# Patient Record
Sex: FEMALE | Race: WHITE | Employment: UNEMPLOYED | URBAN - METROPOLITAN AREA
[De-identification: names, ages, dates, MRNs, and addresses within clinical notes are randomized per-mention and may not be internally consistent; named-entity substitution may affect disease eponyms.]

---
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
## 2025-01-03 LAB
Lab: ABNORMAL
Lab: POSITIVE
NTRA ALPHA-THALASSEMIA: NEGATIVE
NTRA BETA-HEMOGLOBINOPATHIES: NEGATIVE
NTRA CANAVAN DISEASE: NEGATIVE
NTRA CYSTIC FIBROSIS: NEGATIVE
NTRA DUCHENNE/BECKER MUSCULAR DYSTROPHY: NEGATIVE
NTRA FAMILIAL DYSAUTONOMIA: NEGATIVE
NTRA FRAGILE X SYNDROME: NEGATIVE
NTRA GALACTOSEMIA: NEGATIVE
NTRA GAUCHER DISEASE: NEGATIVE
NTRA MEDIUM CHAIN ACYL-COA DEHYDROGENASE DEFICIENCY: NEGATIVE
NTRA POLYCYSTIC KIDNEY DISEASE, AUTOSOMAL RECESSIVE: NEGATIVE
NTRA SMITH-LEMLI-OPITZ SYNDROME: NEGATIVE
NTRA SPINAL MUSCULAR ATROPHY: POSITIVE
NTRA TAY-SACHS DISEASE: NEGATIVE

## 2025-01-15 ENCOUNTER — ROUTINE PRENATAL (OUTPATIENT)
Dept: OBGYN CLINIC | Age: 20
End: 2025-01-15
Payer: MEDICAID

## 2025-01-15 VITALS
SYSTOLIC BLOOD PRESSURE: 116 MMHG | HEART RATE: 93 BPM | DIASTOLIC BLOOD PRESSURE: 78 MMHG | WEIGHT: 189 LBS | BODY MASS INDEX: 31.45 KG/M2

## 2025-01-15 DIAGNOSIS — Z3A.15 15 WEEKS GESTATION OF PREGNANCY: ICD-10-CM

## 2025-01-15 DIAGNOSIS — Z14.8 CARRIER OF SPINAL MUSCULAR ATROPHY: ICD-10-CM

## 2025-01-15 DIAGNOSIS — O09.92 HRP (HIGH RISK PREGNANCY), SECOND TRIMESTER: Primary | ICD-10-CM

## 2025-01-15 PROCEDURE — 99214 OFFICE O/P EST MOD 30 MIN: CPT | Performed by: CLINICAL NURSE SPECIALIST

## 2025-01-15 NOTE — PROGRESS NOTES
Che Og is a 19 y.o. female 15w1d, patient is positive for SMA carrier and she is requesting the father be tested.         OB History    Para Term  AB Living   3       2     SAB IAB Ectopic Molar Multiple Live Births   2                # Outcome Date GA Lbr Christoph/2nd Weight Sex Type Anes PTL Lv   3 Current            2 SAB            1 SAB                Vitals  BP: 116/78  Weight - Scale: 85.7 kg (189 lb)  Pulse: 93  Patient Position: Sitting  Albumin: Negative  Glucose: Negative  Fetal HR: 158  Movement: Present      The patient was seen and evaluated. There was Positive fetal movements. No contractions or leakage of fluid. Signs and symptoms of  labor as well as labor were reviewed.The Nuchal Translucency testing was declined and NIPT-Horizon was completed and shown to be low risk and SMA carrier.  A single marker MSAFP was ordered for a 15-20 week gestational age window. TOP ST [place] Reviewed. Dates were reviewed with the patient.  An 18-22 week anatomy ultrasound has been ordered.  The patient will return to the office for her next visit in 4 weeks. See antepartum flow sheet.     The following was discussed:  A. Counseling on the incidents of birth defects in the general population being 2-4% and that ultrasound does not diagnose every form of congenital abnormality and has limitations .   B. The only way to evaluate the chromosomal makeup to near 100% certainty is with invasive testing such as chorionic villous sampling or amniocentesis.   C. The options for testing listed in (B) with detail and all risks/benefits and alternatives will be discussed in the high risk setting.   D. The patient was counseled on the benefits of NIPT testing and UNITY-Complete panel after 9 weeks of gestation. NIPT testing (UNITY-Complete) options were discussed with the patient for recessive conditions and aneuploidies. This involves taking a maternal blood sample and                     using

## 2025-01-27 DIAGNOSIS — N92.6 MISSED MENSES: ICD-10-CM

## 2025-01-27 DIAGNOSIS — Z32.01 POSITIVE PREGNANCY TEST: ICD-10-CM

## 2025-01-27 DIAGNOSIS — O36.80X0 PREGNANCY WITH INCONCLUSIVE FETAL VIABILITY, SINGLE OR UNSPECIFIED FETUS: ICD-10-CM

## 2025-01-27 RX ORDER — PNV NO.95/FERROUS FUM/FOLIC AC 28MG-0.8MG
1 TABLET ORAL DAILY
Qty: 90 TABLET | Refills: 3 | OUTPATIENT
Start: 2025-01-27

## 2025-01-28 ENCOUNTER — ROUTINE PRENATAL (OUTPATIENT)
Dept: OBGYN CLINIC | Age: 20
End: 2025-01-28
Payer: MEDICAID

## 2025-01-28 ENCOUNTER — HOSPITAL ENCOUNTER (OUTPATIENT)
Age: 20
Setting detail: SPECIMEN
Discharge: HOME | End: 2025-01-28

## 2025-01-28 VITALS
HEART RATE: 88 BPM | SYSTOLIC BLOOD PRESSURE: 112 MMHG | DIASTOLIC BLOOD PRESSURE: 82 MMHG | WEIGHT: 190 LBS | BODY MASS INDEX: 31.62 KG/M2

## 2025-01-28 DIAGNOSIS — N76.0 BV (BACTERIAL VAGINOSIS): ICD-10-CM

## 2025-01-28 DIAGNOSIS — N89.8 VAGINAL ITCHING: ICD-10-CM

## 2025-01-28 DIAGNOSIS — Z14.8 CARRIER OF SPINAL MUSCULAR ATROPHY: ICD-10-CM

## 2025-01-28 DIAGNOSIS — Z3A.17 17 WEEKS GESTATION OF PREGNANCY: ICD-10-CM

## 2025-01-28 DIAGNOSIS — N89.8 VAGINAL DISCHARGE: ICD-10-CM

## 2025-01-28 DIAGNOSIS — O09.92 HRP (HIGH RISK PREGNANCY), SECOND TRIMESTER: Primary | ICD-10-CM

## 2025-01-28 DIAGNOSIS — B96.89 BV (BACTERIAL VAGINOSIS): ICD-10-CM

## 2025-01-28 PROCEDURE — 99213 OFFICE O/P EST LOW 20 MIN: CPT | Performed by: CLINICAL NURSE SPECIALIST

## 2025-01-28 RX ORDER — METRONIDAZOLE 500 MG/1
500 TABLET ORAL 2 TIMES DAILY
Qty: 14 TABLET | Refills: 0 | Status: SHIPPED | OUTPATIENT
Start: 2025-01-28 | End: 2025-02-04

## 2025-01-28 NOTE — PROGRESS NOTES
Che Og is a 19 y.o. female 17w0d, reports green to yellow discharge        OB History    Para Term  AB Living   3       2     SAB IAB Ectopic Molar Multiple Live Births   2                # Outcome Date GA Lbr Christoph/2nd Weight Sex Type Anes PTL Lv   3 Current            2 SAB            1 SAB              Vitals  BP: 112/82  Weight - Scale: 86.2 kg (190 lb)  Pulse: 88  Patient Position: Sitting  Albumin: Negative  Glucose: Negative  Fetal HR: 155  Movement: Present      The patient was seen and evaluated. There was Positive fetal movements. No contractions or leakage of fluid. Signs and symptoms of  labor as well as labor were reviewed.The Nuchal Translucency testing was declined and NIPT-Horizon was completed and she is low risk and carrier for spinal muscular atrophy. A single marker MSAFP was ordered for a 15-20 week gestational age window. TOP ST [place] Reviewed. Dates were reviewed with the patient.  An 18-22 week anatomy ultrasound has been ordered.  The patient will return to the office for her next visit in 4 weeks. See antepartum flow sheet.     The following was discussed:  A. Counseling on the incidents of birth defects in the general population being 2-4% and that ultrasound does not diagnose every form of congenital abnormality and has limitations .   B. The only way to evaluate the chromosomal makeup to near 100% certainty is with invasive testing such as chorionic villous sampling or amniocentesis.   C. The options for testing listed in (B) with detail and all risks/benefits and alternatives will be discussed in the high risk setting.   D. The patient was counseled on the benefits of NIPT testing and UNITY-Complete panel after 9 weeks of gestation. NIPT testing (UNITY-Complete) options were discussed with the patient for recessive conditions and aneuploidies. This involves taking a maternal blood sample and                     using cell-free DNA to determine

## 2025-01-29 LAB
C TRACH DNA SPEC QL PROBE+SIG AMP: NEGATIVE
CANDIDA SPECIES: NEGATIVE
GARDNERELLA VAGINALIS: NEGATIVE
N GONORRHOEA DNA SPEC QL PROBE+SIG AMP: NEGATIVE
SOURCE: NORMAL
SPECIMEN DESCRIPTION: NORMAL
TRICHOMONAS: NEGATIVE

## 2025-02-18 ENCOUNTER — ROUTINE PRENATAL (OUTPATIENT)
Dept: PERINATAL CARE | Age: 20
End: 2025-02-18
Payer: MEDICAID

## 2025-02-18 VITALS
DIASTOLIC BLOOD PRESSURE: 66 MMHG | BODY MASS INDEX: 32.49 KG/M2 | TEMPERATURE: 98.7 F | HEART RATE: 86 BPM | WEIGHT: 195 LBS | SYSTOLIC BLOOD PRESSURE: 122 MMHG | HEIGHT: 65 IN | RESPIRATION RATE: 16 BRPM

## 2025-02-18 DIAGNOSIS — Z34.90 THIRD PREGNANCY: Primary | ICD-10-CM

## 2025-02-18 DIAGNOSIS — Z3A.20 20 WEEKS GESTATION OF PREGNANCY: ICD-10-CM

## 2025-02-18 PROBLEM — O99.210 OBESITY AFFECTING PREGNANCY: Status: ACTIVE | Noted: 2025-02-18

## 2025-02-18 PROBLEM — O09.899 HIGH RISK TEEN PREGNANCY, ANTEPARTUM: Status: ACTIVE | Noted: 2025-02-18

## 2025-02-18 PROBLEM — O99.330 TOBACCO USE COMPLICATING PREGNANCY: Status: ACTIVE | Noted: 2025-02-18

## 2025-02-18 PROBLEM — Z14.8 GENETIC CARRIER: Status: ACTIVE | Noted: 2025-02-18

## 2025-02-18 PROBLEM — O43.119 ANTEPARTUM PLACENTA CIRCUMVALLATA: Status: ACTIVE | Noted: 2025-02-18

## 2025-02-18 PROCEDURE — 76811 OB US DETAILED SNGL FETUS: CPT | Performed by: OBSTETRICS & GYNECOLOGY

## 2025-02-18 PROCEDURE — 99999 PR OFFICE/OUTPT VISIT,PROCEDURE ONLY: CPT | Performed by: OBSTETRICS & GYNECOLOGY

## 2025-02-18 NOTE — PROGRESS NOTES
[facility] MATERNAL FETAL MED  [address]  [facility] [address]  Dept: [phone]     2025    RE:  Che Og     : 2005   AGE: 19 y.o.     Dear Dr. Ramírez,    Thank you for allowing me to see Che Og.  As I'm sure you will recall, Che Og is a 19 y.o. [id number]'s last menstrual period was 10/01/2024. Estimated Date of Delivery: 25 at 20w0d seen in our office today for the following:    REASON FOR VISIT: Level II    Patient Active Problem List    Diagnosis Date Noted    Third pregnancy 2025    20 weeks gestation of pregnancy 2025    Encounter for supervision of normal pregnancy in first trimester 2024        PAST HISTORY:  OB History    Para Term  AB Living   3       2     SAB IAB Ectopic Molar Multiple Live Births   2                # Outcome Date GA Lbr Christoph/2nd Weight Sex Type Anes PTL Lv   3 Current            2 SAB            1 SAB                   MEDICAL:  History reviewed. No pertinent past medical history.     SURGICAL:  History reviewed. No pertinent surgical history.    ALLERGIES:    No Known Allergies      MEDICATIONS:    Current Outpatient Medications   Medication Sig Dispense Refill    Prenatal Vit-Fe Fumarate-FA (PRENATAL VITAMIN) 27-0.8 MG TABS Take 1 tablet by mouth daily 90 tablet 3    doxyLAMINE succinate (GNP SLEEP AID) 25 MG tablet Take 1 tablet by mouth nightly as needed (nausea) 14 tablet 2    pyridoxine (RA VITAMIN B-6) 50 MG tablet Take 0.5 tablets by mouth nightly as needed (nausea) 30 tablet 3     No current facility-administered medications for this visit.        Social History     Socioeconomic History    Marital status: Single     Spouse name: None    Number of children: None    Years of education: None    Highest education level: None   Tobacco Use    Smoking status: Never     Passive exposure: Yes    Smokeless tobacco: Never

## 2025-02-19 ENCOUNTER — ROUTINE PRENATAL (OUTPATIENT)
Dept: OBGYN CLINIC | Age: 20
End: 2025-02-19
Payer: MEDICAID

## 2025-02-19 ENCOUNTER — HOSPITAL ENCOUNTER (OUTPATIENT)
Age: 20
Setting detail: SPECIMEN
Discharge: HOME | End: 2025-02-19

## 2025-02-19 VITALS
SYSTOLIC BLOOD PRESSURE: 120 MMHG | HEART RATE: 89 BPM | BODY MASS INDEX: 32.62 KG/M2 | WEIGHT: 196 LBS | DIASTOLIC BLOOD PRESSURE: 68 MMHG

## 2025-02-19 DIAGNOSIS — O09.92 HRP (HIGH RISK PREGNANCY), SECOND TRIMESTER: ICD-10-CM

## 2025-02-19 DIAGNOSIS — O99.332 PREGNANCY COMPLICATED BY TOBACCO USE IN SECOND TRIMESTER: ICD-10-CM

## 2025-02-19 DIAGNOSIS — Z3A.20 20 WEEKS GESTATION OF PREGNANCY: Primary | ICD-10-CM

## 2025-02-19 DIAGNOSIS — Z14.8 GENETIC CARRIER: ICD-10-CM

## 2025-02-19 DIAGNOSIS — O99.212 OBESITY AFFECTING PREGNANCY IN SECOND TRIMESTER, UNSPECIFIED OBESITY TYPE: ICD-10-CM

## 2025-02-19 DIAGNOSIS — Z3A.20 20 WEEKS GESTATION OF PREGNANCY: ICD-10-CM

## 2025-02-19 DIAGNOSIS — O43.119 ANTEPARTUM PLACENTA CIRCUMVALLATA: ICD-10-CM

## 2025-02-19 PROBLEM — Z34.91 ENCOUNTER FOR SUPERVISION OF NORMAL PREGNANCY IN FIRST TRIMESTER: Status: RESOLVED | Noted: 2024-12-17 | Resolved: 2025-02-19

## 2025-02-19 PROCEDURE — 99214 OFFICE O/P EST MOD 30 MIN: CPT | Performed by: STUDENT IN AN ORGANIZED HEALTH CARE EDUCATION/TRAINING PROGRAM

## 2025-02-19 PROCEDURE — 36415 COLL VENOUS BLD VENIPUNCTURE: CPT | Performed by: STUDENT IN AN ORGANIZED HEALTH CARE EDUCATION/TRAINING PROGRAM

## 2025-02-19 RX ORDER — PNV NO.95/FERROUS FUM/FOLIC AC 28MG-0.8MG
1 TABLET ORAL DAILY
Qty: 90 TABLET | Refills: 3 | Status: SHIPPED | OUTPATIENT
Start: 2025-02-19

## 2025-02-19 NOTE — PROGRESS NOTES
Prenatal Visit    Che Og is a 19 y.o. female  at 20w1d    The patient was seen and evaluated. She has no complaints today. She reports positive fetal movements. She denies contractions, vaginal bleeding and leakage of fluid. Signs and symptoms of labor and pre-eclampsia were reviewed with the patient in detail. She is to report any of these if they occur. She currently denies signs or symptoms of pre-eclampsia including headache, vision changes, RUQ pain.    The patient is considering the influenza vaccine this year.     The problem list reflects the active issues addressed during today's visit    Vitals:  BP: 120/68  Weight - Scale: 88.9 kg (196 lb)  Pulse: 89  Patient Position: Sitting  Albumin: Negative  Glucose: Negative  Fundal Height (cm): 20 cm  Fetal HR: 145  Movement: Present     PRENATAL LAB RESULTS:   Blood Type/Rh: A pos  Antibody Screen: negative  Hemoglobin, Hematocrit, Platelets: 12.9/39.8/260  Rubella: immune  T. Pallidum, IgG: non-reactive  Hepatitis B Surface Antigen: non-reactive   Hepatitis C Antibody: non-reactive   HIV: non-reactive   Gonorrhea: negative  Chlamydia: negative  Urine culture: negative, date: 24  Urine Drug Screen: negative  Group B Strep:  **    28wk 1 hour Glucose Tolerance Test: **    Carrier Screen: SMA carrier  MSAFP: **  Non-Invasive Prenatal Testing: low risk for aneuploidy  Anatomy US: posterior circumvallate placenta, 3VC normal insertion, female in breech presentation, normal fetal anatomy    Tdap:  Flu shot: discussed  Last Pap: n/a    Assessment & Plan:  Che Og is a 19 y.o. female  at 20w1d   - Vitals stable  - Prenatal labs completed and reviewed   - An 18-22 week anatomy ultrasound has been completed and reviewed   - Will clarify if patient requires further MFM appts, none currently scheduled   - NIPT low risk   - Pt desires AFP, will complete today   - Influenza vaccination: R/B/A discussed and patient will consider   -

## 2025-02-22 LAB
AFP INTERPRETATION: NORMAL
AFP MOM: 1.01
AFP SPECIMEN: NORMAL
AFP: 51 NG/ML
DATE OF BIRTH: NORMAL
DATING METHOD: NORMAL
DETERMINED BY: NORMAL
DIABETIC: NEGATIVE
DONOR EGG?: NORMAL
DUE DATE: NORMAL
ESTIMATED DUE DATE: NORMAL
FAMILY HISTORY NTD: NEGATIVE
GESTATIONAL AGE: NORMAL
IN VITRO FERTILIZATION: NORMAL
INSULIN REQ DIABETES: NO
LAST MENSTRUAL PERIOD: NORMAL
MATERNAL AGE AT EDD: 20.4 YR
MATERNAL WEIGHT: 196
MONOCHORIONIC TWINS: NORMAL
NUMBER OF FETUSES: NORMAL
PATIENT WEIGHT UNITS: NORMAL
PATIENT WEIGHT: NORMAL
RACE (MATERNAL): NORMAL
RACE: NORMAL
REPEAT SPECIMEN?: NORMAL
SMOKING: NORMAL
SMOKING: NORMAL
VALPROIC/CARBAMAZEP: NORMAL
ZZ NTE CLEAN UP: HISTORY: NO

## 2025-03-19 ENCOUNTER — ROUTINE PRENATAL (OUTPATIENT)
Dept: OBGYN CLINIC | Age: 20
End: 2025-03-19
Payer: MEDICAID

## 2025-03-19 VITALS
BODY MASS INDEX: 33.95 KG/M2 | SYSTOLIC BLOOD PRESSURE: 118 MMHG | DIASTOLIC BLOOD PRESSURE: 70 MMHG | HEART RATE: 99 BPM | WEIGHT: 204 LBS

## 2025-03-19 DIAGNOSIS — Z14.8 GENETIC CARRIER: ICD-10-CM

## 2025-03-19 DIAGNOSIS — O43.119 ANTEPARTUM PLACENTA CIRCUMVALLATA: ICD-10-CM

## 2025-03-19 DIAGNOSIS — O99.212 OBESITY AFFECTING PREGNANCY IN SECOND TRIMESTER, UNSPECIFIED OBESITY TYPE: ICD-10-CM

## 2025-03-19 DIAGNOSIS — O99.332 PREGNANCY COMPLICATED BY TOBACCO USE IN SECOND TRIMESTER: ICD-10-CM

## 2025-03-19 DIAGNOSIS — O09.92 HRP (HIGH RISK PREGNANCY), SECOND TRIMESTER: Primary | ICD-10-CM

## 2025-03-19 DIAGNOSIS — Z3A.24 24 WEEKS GESTATION OF PREGNANCY: ICD-10-CM

## 2025-03-19 PROCEDURE — 99213 OFFICE O/P EST LOW 20 MIN: CPT | Performed by: CLINICAL NURSE SPECIALIST

## 2025-03-19 NOTE — PROGRESS NOTES
counseled on her preventative health maintenance recommendations and follow-up.      Electronically signed by: Belen Ramírez CNP

## 2025-04-07 ENCOUNTER — TELEPHONE (OUTPATIENT)
Dept: OBGYN CLINIC | Age: 20
End: 2025-04-07

## 2025-04-16 ENCOUNTER — ROUTINE PRENATAL (OUTPATIENT)
Dept: OBGYN CLINIC | Age: 20
End: 2025-04-16
Payer: MEDICAID

## 2025-04-16 ENCOUNTER — HOSPITAL ENCOUNTER (OUTPATIENT)
Age: 20
Setting detail: SPECIMEN
Discharge: HOME | End: 2025-04-16

## 2025-04-16 VITALS
HEART RATE: 102 BPM | DIASTOLIC BLOOD PRESSURE: 70 MMHG | BODY MASS INDEX: 35.94 KG/M2 | SYSTOLIC BLOOD PRESSURE: 118 MMHG | WEIGHT: 216 LBS

## 2025-04-16 DIAGNOSIS — O99.212 OBESITY AFFECTING PREGNANCY IN SECOND TRIMESTER, UNSPECIFIED OBESITY TYPE: ICD-10-CM

## 2025-04-16 DIAGNOSIS — Z3A.28 28 WEEKS GESTATION OF PREGNANCY: Primary | ICD-10-CM

## 2025-04-16 DIAGNOSIS — Z3A.28 28 WEEKS GESTATION OF PREGNANCY: ICD-10-CM

## 2025-04-16 DIAGNOSIS — O09.92 HRP (HIGH RISK PREGNANCY), SECOND TRIMESTER: ICD-10-CM

## 2025-04-16 DIAGNOSIS — O99.333 PREGNANCY COMPLICATED BY TOBACCO USE IN THIRD TRIMESTER: ICD-10-CM

## 2025-04-16 DIAGNOSIS — O09.899 HIGH RISK TEEN PREGNANCY, ANTEPARTUM: ICD-10-CM

## 2025-04-16 DIAGNOSIS — Z14.8 GENETIC CARRIER: ICD-10-CM

## 2025-04-16 LAB
BASOPHILS # BLD: 0.06 K/UL (ref 0–0.2)
BASOPHILS NFR BLD: 0 % (ref 0–2)
EOSINOPHIL # BLD: 0.16 K/UL (ref 0–0.44)
EOSINOPHILS RELATIVE PERCENT: 1 % (ref 1–4)
ERYTHROCYTE [DISTWIDTH] IN BLOOD BY AUTOMATED COUNT: 13.3 % (ref 11.8–14.4)
GLUCOSE 3H P 100 G GLC PO SERPL-MCNC: 159 MG/DL
HCT VFR BLD AUTO: 34.9 % (ref 36.3–47.1)
HGB BLD-MCNC: 11.1 G/DL (ref 11.9–15.1)
IMM GRANULOCYTES # BLD AUTO: 0.29 K/UL (ref 0–0.3)
IMM GRANULOCYTES NFR BLD: 2 %
LYMPHOCYTES NFR BLD: 2.04 K/UL (ref 1.2–5.2)
LYMPHOCYTES RELATIVE PERCENT: 14 % (ref 25–45)
MCH RBC QN AUTO: 29.1 PG (ref 25.2–33.5)
MCHC RBC AUTO-ENTMCNC: 31.8 G/DL (ref 28.4–34.8)
MCV RBC AUTO: 91.6 FL (ref 82.6–102.9)
MONOCYTES NFR BLD: 0.63 K/UL (ref 0.1–1.4)
MONOCYTES NFR BLD: 4 % (ref 2–8)
NEUTROPHILS NFR BLD: 79 % (ref 34–64)
NEUTS SEG NFR BLD: 11.96 K/UL (ref 1.8–8)
NRBC BLD-RTO: 0 PER 100 WBC
PLATELET # BLD AUTO: 265 K/UL (ref 138–453)
PMV BLD AUTO: 9.6 FL (ref 8.1–13.5)
RBC # BLD AUTO: 3.81 M/UL (ref 3.95–5.11)
T PALLIDUM AB SER QL IA: NONREACTIVE
WBC OTHER # BLD: 15.1 K/UL (ref 4.5–13.5)

## 2025-04-16 PROCEDURE — 36415 COLL VENOUS BLD VENIPUNCTURE: CPT | Performed by: STUDENT IN AN ORGANIZED HEALTH CARE EDUCATION/TRAINING PROGRAM

## 2025-04-16 PROCEDURE — 99213 OFFICE O/P EST LOW 20 MIN: CPT | Performed by: STUDENT IN AN ORGANIZED HEALTH CARE EDUCATION/TRAINING PROGRAM

## 2025-04-16 PROCEDURE — 90471 IMMUNIZATION ADMIN: CPT | Performed by: STUDENT IN AN ORGANIZED HEALTH CARE EDUCATION/TRAINING PROGRAM

## 2025-04-16 PROCEDURE — 90715 TDAP VACCINE 7 YRS/> IM: CPT | Performed by: STUDENT IN AN ORGANIZED HEALTH CARE EDUCATION/TRAINING PROGRAM

## 2025-04-16 NOTE — PROGRESS NOTES
call or present to the hospital if she experiences signs or symptoms of  labor and pre-eclampsia were reviewed if indicated.  - The patient was screened for decreased fetal movement. She was instructed that the baby should move at a minimum of ten times within one hour after a meal. If decreased fetal activity noted, the patient was instructed to lay down on her left side twenty minutes after eating and count movements for up to one hour with a target value of ten movements. She was instructed to notify the office if she did not make that target after two attempts or if after any attempt there was less than four movements  - Epic problem list reviewed and updated  - Return to office in 2 weeks      Patient Active Problem List    Diagnosis Date Noted    Third pregnancy 2025    20 weeks gestation of pregnancy 2025    High risk teen pregnancy, antepartum 2025    Obesity affecting pregnancy 2025     Prepregnancy BMI 29.62      Antepartum placenta circumvallata 2025    Tobacco use complicating pregnancy 2025    SMA Carrier 2025     FOB given kit, will consider testing       Return in about 2 weeks (around 2025) for prenatal visit.    DO Alexander Trinidad [facility]  2025, 10:46 AM

## 2025-04-17 ENCOUNTER — TELEPHONE (OUTPATIENT)
Dept: OBGYN CLINIC | Age: 20
End: 2025-04-17

## 2025-04-17 LAB
MICROORGANISM SPEC CULT: NORMAL
SERVICE CMNT-IMP: NORMAL
SPECIMEN DESCRIPTION: NORMAL

## 2025-04-17 NOTE — TELEPHONE ENCOUNTER
Patient notified of elevated one hour gtt and given instructions on completing a 3 hour GTT. Patient gave verbal understanding.

## 2025-04-18 ENCOUNTER — RESULTS FOLLOW-UP (OUTPATIENT)
Dept: OBGYN | Age: 20
End: 2025-04-18

## 2025-04-18 DIAGNOSIS — O99.810 ABNORMAL GLUCOSE TOLERANCE TEST (GTT) DURING PREGNANCY, ANTEPARTUM: Primary | ICD-10-CM

## 2025-04-23 ENCOUNTER — HOSPITAL ENCOUNTER (OUTPATIENT)
Age: 20
Discharge: HOME | End: 2025-04-23
Payer: MEDICAID

## 2025-04-23 ENCOUNTER — RESULTS FOLLOW-UP (OUTPATIENT)
Dept: OBGYN CLINIC | Age: 20
End: 2025-04-23

## 2025-04-23 DIAGNOSIS — O24.410 DIET CONTROLLED GESTATIONAL DIABETES MELLITUS (GDM) IN SECOND TRIMESTER: Primary | ICD-10-CM

## 2025-04-23 DIAGNOSIS — O99.810 ABNORMAL GLUCOSE TOLERANCE TEST (GTT) DURING PREGNANCY, ANTEPARTUM: ICD-10-CM

## 2025-04-23 DIAGNOSIS — O24.410 DIET CONTROLLED GESTATIONAL DIABETES MELLITUS (GDM), ANTEPARTUM: Primary | ICD-10-CM

## 2025-04-23 LAB
AMOUNT GLUCOSE GIVEN: 100 G
GLUCOSE 2H P 100 G GLC PO SERPL-MCNC: 91 MG/DL
GLUCOSE 3H P 100 G GLC PO SERPL-MCNC: 211 MG/DL
GLUCOSE TOLERANCE TEST 2 HOUR: 180 MG/DL
GLUCOSE TOLERANCE TEST 3 HOUR: 117 MG/DL

## 2025-04-23 PROCEDURE — 82952 GTT-ADDED SAMPLES: CPT

## 2025-04-23 PROCEDURE — 36415 COLL VENOUS BLD VENIPUNCTURE: CPT

## 2025-04-23 PROCEDURE — 82951 GLUCOSE TOLERANCE TEST (GTT): CPT

## 2025-04-23 NOTE — TELEPHONE ENCOUNTER
----- Message from Dr. Stone Keen, DO sent at 4/23/2025  3:34 PM EDT -----  Patient failed 3hr glucose tolerance test. Can we put in updated referral to [facility]? I will send supplies to patient so she can check her blood sugars. Please inform patient.

## 2025-04-23 NOTE — TELEPHONE ENCOUNTER
[facility] Result Note Patient Contact Communication   [address] [place] [address]      04/23/25   4:04 PM     Patients Name: Che Og   Medical Record Number: [id number]   Contact Serial Number: [id number]  YOB: 2005       Patients Phone Number: [phone]     Description of Recommendations:  The patient was contacted and her identity was confirmed with two of the specific identifiers listed above.  The information regarding her recent testing results and provider recommendations were reviewed with her in detail and all questions were answered.   Recent labs/Cultures/ Imaging Studies/Pathology reports and Urinalysis results are included:      Recommendations given by provider:  Patient failed 3hr glucose tolerance test. Can we put in updated referral to [facility]? I will send supplies to patient so she can check her blood sugars. Please inform patient.     The patient verbalized understanding of the information above and the recommendation by the provider. She will contact her PCP if any other questions arise or contact our office for any other clarifications.        Electronically signed by Bj Deluca LPN on 4/23/2025 at 4:04 PM

## 2025-04-30 ENCOUNTER — ROUTINE PRENATAL (OUTPATIENT)
Dept: OBGYN CLINIC | Age: 20
End: 2025-04-30
Payer: MEDICAID

## 2025-04-30 VITALS
DIASTOLIC BLOOD PRESSURE: 80 MMHG | BODY MASS INDEX: 35.78 KG/M2 | WEIGHT: 215 LBS | SYSTOLIC BLOOD PRESSURE: 118 MMHG | HEART RATE: 96 BPM

## 2025-04-30 DIAGNOSIS — O99.213 OBESITY AFFECTING PREGNANCY IN THIRD TRIMESTER, UNSPECIFIED OBESITY TYPE: ICD-10-CM

## 2025-04-30 DIAGNOSIS — O43.119 ANTEPARTUM PLACENTA CIRCUMVALLATA: ICD-10-CM

## 2025-04-30 DIAGNOSIS — O09.93 HRP (HIGH RISK PREGNANCY), THIRD TRIMESTER: Primary | ICD-10-CM

## 2025-04-30 DIAGNOSIS — O24.410 DIET CONTROLLED GESTATIONAL DIABETES MELLITUS (GDM), ANTEPARTUM: ICD-10-CM

## 2025-04-30 DIAGNOSIS — Z14.8 GENETIC CARRIER: ICD-10-CM

## 2025-04-30 DIAGNOSIS — Z3A.30 30 WEEKS GESTATION OF PREGNANCY: ICD-10-CM

## 2025-04-30 PROCEDURE — 99213 OFFICE O/P EST LOW 20 MIN: CPT | Performed by: CLINICAL NURSE SPECIALIST

## 2025-04-30 NOTE — PROGRESS NOTES
.chandra        Che Og is a 20 y.o. female 30w1d        OB History    Para Term  AB Living   3    2    SAB IAB Ectopic Molar Multiple Live Births   2           # Outcome Date GA Lbr Christoph/2nd Weight Sex Type Anes PTL Lv   3 Current            2 SAB            1 SAB                Vitals  BP: 118/80  Weight - Scale: 97.5 kg (215 lb)  Pulse: 96  Patient Position: Sitting  Fundal Height (cm): 30 cm  Fetal HR: 150  Movement: Present      The patient was seen and evaluated. There was positive fetal movements. No contractions or leakage of fluid. Signs and symptoms of  labor as well as labor were reviewed. The S/S of Pre-Eclampsia were reviewed with the patient in detail. She is to report any of these if they occur. She currently denies any of these.    The patient had her 28 week labs completed.  Hospital Outpatient Visit on 2025   Component Date Value Ref Range Status    Amount Glucose Given 2025 100  g Final    Glucose, Fasting 2025 91  mg/dL Final    Comment: Refer to the ACOG standards for reference ranges. Longoria and Coustan Conversion is the   preferred diagnostic criteria.      Glucose, GTT - 1 Hour 2025 211  mg/dL Final    Comment: Refer to the ACOG standards for reference ranges. Longoria and Coustan Conversion is the   preferred diagnostic criteria.      Glucose, GTT - 2 Hour 2025 180  mg/dL Final    Comment: Refer to the ACOG standards for reference ranges. Longoria and Coustan Conversion is the   preferred diagnostic criteria.      Glucose, GTT - 3 Hour 2025 117  mg/dL Final    Comment: Refer to the ACOG standards for reference ranges. Longoria and Coustan Conversion is the   preferred diagnostic criteria.     Hospital Outpatient Visit on 2025   Component Date Value Ref Range Status    Specimen Description 2025 .CLEAN CATCH URINE   Final    Special Requests 2025 Site: Urine   Final    Culture 2025 NO

## 2025-05-05 ENCOUNTER — ROUTINE PRENATAL (OUTPATIENT)
Age: 20
End: 2025-05-05
Payer: MEDICAID

## 2025-05-05 VITALS
HEIGHT: 65 IN | BODY MASS INDEX: 35.82 KG/M2 | TEMPERATURE: 98.2 F | WEIGHT: 215 LBS | HEART RATE: 95 BPM | RESPIRATION RATE: 16 BRPM | SYSTOLIC BLOOD PRESSURE: 122 MMHG | DIASTOLIC BLOOD PRESSURE: 65 MMHG

## 2025-05-05 DIAGNOSIS — Z3A.30 30 WEEKS GESTATION OF PREGNANCY: ICD-10-CM

## 2025-05-05 DIAGNOSIS — O24.410 DIET CONTROLLED GESTATIONAL DIABETES MELLITUS (GDM), ANTEPARTUM: ICD-10-CM

## 2025-05-05 DIAGNOSIS — O99.333 PREGNANCY COMPLICATED BY TOBACCO USE IN THIRD TRIMESTER: Primary | ICD-10-CM

## 2025-05-05 DIAGNOSIS — Z34.90 THIRD PREGNANCY: ICD-10-CM

## 2025-05-05 DIAGNOSIS — O09.899 HIGH RISK TEEN PREGNANCY, ANTEPARTUM: ICD-10-CM

## 2025-05-05 PROCEDURE — 76819 FETAL BIOPHYS PROFIL W/O NST: CPT | Performed by: OBSTETRICS & GYNECOLOGY

## 2025-05-05 PROCEDURE — 99212 OFFICE O/P EST SF 10 MIN: CPT | Performed by: OBSTETRICS & GYNECOLOGY

## 2025-05-05 PROCEDURE — 99213 OFFICE O/P EST LOW 20 MIN: CPT | Performed by: OBSTETRICS & GYNECOLOGY

## 2025-05-05 PROCEDURE — 76805 OB US >/= 14 WKS SNGL FETUS: CPT | Performed by: OBSTETRICS & GYNECOLOGY

## 2025-05-05 NOTE — PROGRESS NOTES
obvious fetal anomalies are noted.  The fetus is in a breech presentation.  3.  The amniotic fluid volume is normal and the placenta is posterior.    RECOMMENDATIONS:  Each of the recommendations were discussed with the patient:  1.  Growth ultrasounds every 4 weeks.  2.  Begin weekly antepartum testing including nonstress test in 4 weeks.  3.  Continue diabetic management.  4.  Delivery at 39 weeks gestation.  5.  Follow-up would be otherwise as clinically indicated    The patient is to continue to follow with you in your office for ongoing obstetric care.     PLAN:    As noted above or sooner prn.    Sincerely,        Jett Beard MD    I spent 25 minutes of direct contact time with the patient of which greater than 50% of the time was used to  the patient, discuss complications and problems related to her pregnancy, or coordinating her care in addition to the time spent interpreting the ultrasound. I answered all of her questions to her satisfaction.

## 2025-05-14 ENCOUNTER — ROUTINE PRENATAL (OUTPATIENT)
Dept: OBGYN CLINIC | Age: 20
End: 2025-05-14
Payer: MEDICAID

## 2025-05-14 VITALS
BODY MASS INDEX: 36.11 KG/M2 | WEIGHT: 217 LBS | DIASTOLIC BLOOD PRESSURE: 82 MMHG | HEART RATE: 77 BPM | SYSTOLIC BLOOD PRESSURE: 116 MMHG

## 2025-05-14 DIAGNOSIS — Z14.8 GENETIC CARRIER: ICD-10-CM

## 2025-05-14 DIAGNOSIS — O99.213 OBESITY AFFECTING PREGNANCY IN THIRD TRIMESTER, UNSPECIFIED OBESITY TYPE: ICD-10-CM

## 2025-05-14 DIAGNOSIS — O09.93 HRP (HIGH RISK PREGNANCY), THIRD TRIMESTER: Primary | ICD-10-CM

## 2025-05-14 DIAGNOSIS — O24.410 DIET CONTROLLED GESTATIONAL DIABETES MELLITUS (GDM), ANTEPARTUM: ICD-10-CM

## 2025-05-14 DIAGNOSIS — Z3A.32 32 WEEKS GESTATION OF PREGNANCY: ICD-10-CM

## 2025-05-14 PROCEDURE — 99213 OFFICE O/P EST LOW 20 MIN: CPT | Performed by: STUDENT IN AN ORGANIZED HEALTH CARE EDUCATION/TRAINING PROGRAM

## 2025-05-14 NOTE — PROGRESS NOTES
Prenatal Visit    Che Og is a 20 y.o. female  at 32w1d    The patient was seen and evaluated. She has no complaints. She reports she has been checking her blood sugars but hasn't uploaded results, encouraged her to do so. She reports positive fetal movements. She denies contractions, vaginal bleeding and leakage of fluid. Signs and symptoms of labor and pre-eclampsia were reviewed with the patient in detail. She is to report any of these if they occur. She currently denies any of these.    The patient is Rh positive and Rhogam is not indicated in this pregnancy.  The patient already received  the T-Dap Vaccine (27-36 weeks) this pregnancy.     The problem list reflects the active issues addressed during today's visit    Vitals:  BP: 116/82  Weight - Scale: 98.4 kg (217 lb)  Pulse: 77  Patient Position: Sitting  Albumin: Negative  Glucose: Negative  Fundal Height (cm): 33 cm  Fetal HR: 155  Movement: Present     PRENATAL LAB RESULTS:   Blood Type/Rh: A pos  Antibody Screen: negative  Hemoglobin, Hematocrit, Platelets: 12.9/39.8/260  Rubella: immune  T. Pallidum, IgG: non-reactive  Hepatitis B Surface Antigen: non-reactive   Hepatitis C Antibody: non-reactive   HIV: non-reactive   Gonorrhea: negative  Chlamydia: negative  Urine culture: negative, date: 24  Urine Drug Screen: negative  Group B Strep:  **     28wk 1 hour Glucose Tolerance Test: 159  28wk 3 hour Glucose Tolerance Test: 91/211/180/117     Carrier Screen: SMA carrier  MSAFP: negative  Non-Invasive Prenatal Testing: low risk for aneuploidy  Anatomy US: posterior circumvallate placenta, 3VC normal insertion, female in breech presentation, normal fetal anatomy     Tdap: 25  Flu shot: discussed  Last Pap: n/a      Assessment & Plan:  Che Og is a 20 y.o. female  at 32w1d   - Vitals stable  - 28 week labs completed and reviewed  - Encourage compliance with checking blood sugars   - Tdap vaccination: already received

## 2025-05-21 ENCOUNTER — ROUTINE PRENATAL (OUTPATIENT)
Dept: OBGYN CLINIC | Age: 20
End: 2025-05-21
Payer: MEDICAID

## 2025-05-21 VITALS
HEART RATE: 98 BPM | DIASTOLIC BLOOD PRESSURE: 76 MMHG | WEIGHT: 218 LBS | BODY MASS INDEX: 36.28 KG/M2 | SYSTOLIC BLOOD PRESSURE: 120 MMHG

## 2025-05-21 DIAGNOSIS — O43.119 ANTEPARTUM PLACENTA CIRCUMVALLATA: ICD-10-CM

## 2025-05-21 DIAGNOSIS — O99.213 OBESITY AFFECTING PREGNANCY IN THIRD TRIMESTER, UNSPECIFIED OBESITY TYPE: ICD-10-CM

## 2025-05-21 DIAGNOSIS — O24.410 DIET CONTROLLED GESTATIONAL DIABETES MELLITUS (GDM), ANTEPARTUM: ICD-10-CM

## 2025-05-21 DIAGNOSIS — Z3A.33 33 WEEKS GESTATION OF PREGNANCY: ICD-10-CM

## 2025-05-21 DIAGNOSIS — O09.93 HRP (HIGH RISK PREGNANCY), THIRD TRIMESTER: Primary | ICD-10-CM

## 2025-05-21 DIAGNOSIS — Z14.8 GENETIC CARRIER: ICD-10-CM

## 2025-05-21 PROCEDURE — 99213 OFFICE O/P EST LOW 20 MIN: CPT | Performed by: CLINICAL NURSE SPECIALIST

## 2025-05-21 NOTE — PROGRESS NOTES
Che Og is a 20 y.o. female 33w1d        OB History    Para Term  AB Living   3    2    SAB IAB Ectopic Molar Multiple Live Births   2           # Outcome Date GA Lbr Christoph/2nd Weight Sex Type Anes PTL Lv   3 Current            2 SAB            1 SAB                Vitals  BP: 120/76  Weight - Scale: 98.9 kg (218 lb)  Pulse: 98  Patient Position: Sitting  Albumin: Negative  Glucose: Negative  Movement: Present      The patient was seen and evaluated. There was positive fetal movements. No contractions or leakage of fluid. Signs and symptoms of  labor as well as labor were reviewed. The S/S of Pre-Eclampsia were reviewed with the patient in detail. She is to report any of these if they occur. She currently denies any of these.    The patient had her 28 week labs completed.  Hospital Outpatient Visit on 2025   Component Date Value Ref Range Status    Amount Glucose Given 2025 100  g Final    Glucose, Fasting 2025 91  mg/dL Final    Comment: Refer to the ACOG standards for reference ranges. Longoria and Coustan Conversion is the   preferred diagnostic criteria.      Glucose, GTT - 1 Hour 2025 211  mg/dL Final    Comment: Refer to the ACOG standards for reference ranges. Longoria and Coustan Conversion is the   preferred diagnostic criteria.      Glucose, GTT - 2 Hour 2025 180  mg/dL Final    Comment: Refer to the ACOG standards for reference ranges. Longoria and Coustan Conversion is the   preferred diagnostic criteria.      Glucose, GTT - 3 Hour 2025 117  mg/dL Final    Comment: Refer to the ACOG standards for reference ranges. Longoria and Coustan Conversion is the   preferred diagnostic criteria.     ]    CAROLINE by TVUS/LMP: 2025    High Risk Dx:  spinal muscular atrophy, vaping in pregnancy, obesity BMI>30    Pre-pregnancy BMI: 29.62  PRENATAL LAB RESULTS:   Blood Type/Rh: A +  Antibody Screen: negative  Hemoglobin, Hematocrit,

## 2025-05-28 ENCOUNTER — ROUTINE PRENATAL (OUTPATIENT)
Dept: OBGYN CLINIC | Age: 20
End: 2025-05-28
Payer: MEDICAID

## 2025-05-28 VITALS
DIASTOLIC BLOOD PRESSURE: 78 MMHG | SYSTOLIC BLOOD PRESSURE: 120 MMHG | WEIGHT: 220 LBS | BODY MASS INDEX: 36.61 KG/M2 | HEART RATE: 79 BPM

## 2025-05-28 DIAGNOSIS — O24.410 DIET CONTROLLED GESTATIONAL DIABETES MELLITUS (GDM), ANTEPARTUM: ICD-10-CM

## 2025-05-28 DIAGNOSIS — Z14.8 GENETIC CARRIER: ICD-10-CM

## 2025-05-28 DIAGNOSIS — O99.213 OBESITY AFFECTING PREGNANCY IN THIRD TRIMESTER, UNSPECIFIED OBESITY TYPE: ICD-10-CM

## 2025-05-28 DIAGNOSIS — O43.119 ANTEPARTUM PLACENTA CIRCUMVALLATA: ICD-10-CM

## 2025-05-28 DIAGNOSIS — O09.93 HRP (HIGH RISK PREGNANCY), THIRD TRIMESTER: Primary | ICD-10-CM

## 2025-05-28 DIAGNOSIS — Z3A.34 34 WEEKS GESTATION OF PREGNANCY: ICD-10-CM

## 2025-05-28 PROCEDURE — 99213 OFFICE O/P EST LOW 20 MIN: CPT | Performed by: CLINICAL NURSE SPECIALIST

## 2025-05-28 NOTE — PROGRESS NOTES
.chandra        Che Og is a 20 y.o. female 34w1d        OB History    Para Term  AB Living   3    2    SAB IAB Ectopic Molar Multiple Live Births   2           # Outcome Date GA Lbr Christoph/2nd Weight Sex Type Anes PTL Lv   3 Current            2 SAB            1 SAB                Vitals  BP: 120/78  Weight - Scale: 99.8 kg (220 lb)  Pulse: 79  Patient Position: Sitting  Albumin: Negative  Glucose: Negative  Fundal Height (cm): 35 cm  Fetal HR: 150  Movement: Present      The patient was seen and evaluated. There was positive fetal movements. No contractions or leakage of fluid. Signs and symptoms of  labor as well as labor were reviewed. The S/S of Pre-Eclampsia were reviewed with the patient in detail. She is to report any of these if they occur. She currently denies any of these.    The patient had her 28 week labs completed.  No visits with results within 5 Week(s) from this visit.   Latest known visit with results is:   Hospital Outpatient Visit on 2025   Component Date Value Ref Range Status    Amount Glucose Given 2025 100  g Final    Glucose, Fasting 2025 91  mg/dL Final    Comment: Refer to the ACOG standards for reference ranges. Longoria and Coustan Conversion is the   preferred diagnostic criteria.      Glucose, GTT - 1 Hour 2025 211  mg/dL Final    Comment: Refer to the ACOG standards for reference ranges. Longoria and Coustan Conversion is the   preferred diagnostic criteria.      Glucose, GTT - 2 Hour 2025 180  mg/dL Final    Comment: Refer to the ACOG standards for reference ranges. Longoria and Coustan Conversion is the   preferred diagnostic criteria.      Glucose, GTT - 3 Hour 2025 117  mg/dL Final    Comment: Refer to the ACOG standards for reference ranges. Longoria and Coustan Conversion is the   preferred diagnostic criteria.     ]    CAROLINE by TVUS/LMP: 2025    High Risk Dx:  spinal muscular atrophy, vaping in

## 2025-06-02 ENCOUNTER — ROUTINE PRENATAL (OUTPATIENT)
Age: 20
End: 2025-06-02
Payer: MEDICAID

## 2025-06-02 VITALS
HEART RATE: 96 BPM | HEIGHT: 65 IN | TEMPERATURE: 98.3 F | SYSTOLIC BLOOD PRESSURE: 122 MMHG | BODY MASS INDEX: 36.84 KG/M2 | RESPIRATION RATE: 16 BRPM | DIASTOLIC BLOOD PRESSURE: 66 MMHG | WEIGHT: 221.12 LBS

## 2025-06-02 DIAGNOSIS — O24.410 DIET CONTROLLED GESTATIONAL DIABETES MELLITUS (GDM), ANTEPARTUM: ICD-10-CM

## 2025-06-02 DIAGNOSIS — Z34.90 THIRD PREGNANCY: ICD-10-CM

## 2025-06-02 DIAGNOSIS — O43.119 ANTEPARTUM PLACENTA CIRCUMVALLATA: ICD-10-CM

## 2025-06-02 DIAGNOSIS — Z14.8 GENETIC CARRIER: ICD-10-CM

## 2025-06-02 DIAGNOSIS — O99.810 ABNORMAL GLUCOSE TOLERANCE TEST (GTT) DURING PREGNANCY, ANTEPARTUM: Primary | ICD-10-CM

## 2025-06-02 DIAGNOSIS — O09.899 HIGH RISK TEEN PREGNANCY, ANTEPARTUM: ICD-10-CM

## 2025-06-02 DIAGNOSIS — O99.333 PREGNANCY COMPLICATED BY TOBACCO USE IN THIRD TRIMESTER: ICD-10-CM

## 2025-06-02 DIAGNOSIS — Z3A.34 34 WEEKS GESTATION OF PREGNANCY: ICD-10-CM

## 2025-06-02 PROCEDURE — 76819 FETAL BIOPHYS PROFIL W/O NST: CPT | Performed by: OBSTETRICS & GYNECOLOGY

## 2025-06-02 PROCEDURE — 99212 OFFICE O/P EST SF 10 MIN: CPT | Performed by: OBSTETRICS & GYNECOLOGY

## 2025-06-02 PROCEDURE — 76816 OB US FOLLOW-UP PER FETUS: CPT | Performed by: OBSTETRICS & GYNECOLOGY

## 2025-06-02 PROCEDURE — 99213 OFFICE O/P EST LOW 20 MIN: CPT | Performed by: OBSTETRICS & GYNECOLOGY

## 2025-06-02 NOTE — PROGRESS NOTES
[facility] MATERNAL FETAL MED  [address]  [place] [address]  Dept: [phone]     2025    RE:  Che Og     : 2005   AGE: 20 y.o.     Dear Dr. Keen,    Thank you for allowing me to see Che Og.  As I'm sure you will recall, Che Og is a 20 y.o. [id number]'s last menstrual period was 10/01/2024. Estimated Date of Delivery: 25 at 34w6d seen in our office today for the following:    REASON FOR VISIT: Growth, BPP, diabetic management    Patient Active Problem List    Diagnosis Date Noted    Diet controlled gestational diabetes mellitus (GDM), antepartum 2025    Elevated 1hr GTT 2025    Third pregnancy 2025    34 weeks gestation of pregnancy 2025    High risk teen pregnancy, antepartum 2025    Obesity affecting pregnancy 2025    Antepartum placenta circumvallata 2025    Tobacco use complicating pregnancy 2025    SMA Carrier 2025        PAST HISTORY:  OB History    Para Term  AB Living   3    2    SAB IAB Ectopic Molar Multiple Live Births   2           # Outcome Date GA Lbr Christoph/2nd Weight Sex Type Anes PTL Lv   3 Current            2 SAB            1 SAB                   MEDICAL:  History reviewed. No pertinent past medical history.     SURGICAL:  History reviewed. No pertinent surgical history.    ALLERGIES:    No Known Allergies      MEDICATIONS:    Current Outpatient Medications   Medication Sig Dispense Refill    blood glucose monitor kit and supplies Dispense sufficient amount for QID testing frequency plus additional to accommodate PRN testing needs. Dispense all needed supplies to include: monitor, strips, lancing device, lancets, control solutions, alcohol swabs. 1 kit 0    Prenatal Vit-Fe Fumarate-FA (PRENATAL VITAMIN) 27-0.8 MG TABS Take 1 tablet by mouth daily 90 tablet 3    doxyLAMINE succinate (GNP SLEEP AID) 25 MG

## 2025-06-04 ENCOUNTER — ROUTINE PRENATAL (OUTPATIENT)
Dept: OBGYN CLINIC | Age: 20
End: 2025-06-04
Payer: MEDICAID

## 2025-06-04 VITALS
WEIGHT: 219.2 LBS | BODY MASS INDEX: 36.48 KG/M2 | DIASTOLIC BLOOD PRESSURE: 86 MMHG | HEART RATE: 97 BPM | SYSTOLIC BLOOD PRESSURE: 120 MMHG

## 2025-06-04 DIAGNOSIS — Z3A.35 35 WEEKS GESTATION OF PREGNANCY: ICD-10-CM

## 2025-06-04 DIAGNOSIS — O99.213 OBESITY AFFECTING PREGNANCY IN THIRD TRIMESTER, UNSPECIFIED OBESITY TYPE: ICD-10-CM

## 2025-06-04 DIAGNOSIS — O09.93 HRP (HIGH RISK PREGNANCY), THIRD TRIMESTER: Primary | ICD-10-CM

## 2025-06-04 DIAGNOSIS — Z14.8 GENETIC CARRIER: ICD-10-CM

## 2025-06-04 DIAGNOSIS — O24.410 DIET CONTROLLED GESTATIONAL DIABETES MELLITUS (GDM), ANTEPARTUM: ICD-10-CM

## 2025-06-04 PROBLEM — Z3A.34 34 WEEKS GESTATION OF PREGNANCY: Status: RESOLVED | Noted: 2025-02-18 | Resolved: 2025-06-04

## 2025-06-04 PROCEDURE — 59025 FETAL NON-STRESS TEST: CPT | Performed by: STUDENT IN AN ORGANIZED HEALTH CARE EDUCATION/TRAINING PROGRAM

## 2025-06-04 PROCEDURE — 99213 OFFICE O/P EST LOW 20 MIN: CPT | Performed by: STUDENT IN AN ORGANIZED HEALTH CARE EDUCATION/TRAINING PROGRAM

## 2025-06-04 NOTE — PROGRESS NOTES
y.o. female  at 35w1d   - Vitals stable   - Tdap vaccination: discussed recommendations for TDAP immunization, patient already received.   - Rhogam: not indicated   - Continue PNV daily   -  testing indicated, NST today reactive   - Patient was screened for  labor and s/sx of PreEclampsia. Recommendations when to call or present to the hospital if she experiences signs or symptoms of  labor and pre-eclampsia were reviewed if indicated.  - The patient was screened for decreased fetal movement. She was instructed that the baby should move at a minimum of ten times within one hour after a meal. If decreased fetal activity noted, the patient was instructed to lay down on her left side twenty minutes after eating and count movements for up to one hour with a target value of ten movements. She was instructed to notify the office if she did not make that target after two attempts or if after any attempt there was less than four movements.   - The patient was counseled on the need to choose her pediatrician for her baby.   - Epic problem list reviewed and updated  - Return to office in 1 week    - Discussed r/b/a of RR IOL, patient desires, will schedule prior to next appt    Patient Active Problem List    Diagnosis Date Noted    Diet controlled gestational diabetes mellitus (GDM), antepartum 2025     NSTs at 34wk per MFM      Elevated 1hr GTT 2025    Third pregnancy 2025    High risk teen pregnancy, antepartum 2025    Obesity affecting pregnancy 2025     Prepregnancy BMI 29.62      Antepartum placenta circumvallata 2025    Tobacco use complicating pregnancy 2025    SMA Carrier 2025     FOB given kit, will consider testing       Return in about 1 week (around 2025) for prenatal appt/NST.    Stone Keen DO  [facility]  2025, 2:54 PM

## 2025-06-09 ENCOUNTER — TELEPHONE (OUTPATIENT)
Dept: OBGYN CLINIC | Age: 20
End: 2025-06-09

## 2025-06-09 NOTE — TELEPHONE ENCOUNTER
----- Message from Dr. Stone Keen DO sent at 6/4/2025 10:40 AM EDT -----  Induction and C/S Scheduling:    Diagnosis: GDMA1    Procedure: IOL    Delivery Window: [id number]    CAROLINE: 7/8/25    Date Preference: 7/1/25 @2200    ERAS Counseling/Soap Given: n/a    Special Notes: n/a     Patient notified of Induction date and time. Patient informed that she will need to enter through the ER and patient instructed to call L&D unity at 2100 to confirm time. Patient gave verbal understanding.

## 2025-06-11 ENCOUNTER — HOSPITAL ENCOUNTER (OUTPATIENT)
Age: 20
Setting detail: SPECIMEN
Discharge: HOME | End: 2025-06-11

## 2025-06-11 ENCOUNTER — ROUTINE PRENATAL (OUTPATIENT)
Dept: OBGYN CLINIC | Age: 20
End: 2025-06-11
Payer: MEDICAID

## 2025-06-11 VITALS
SYSTOLIC BLOOD PRESSURE: 112 MMHG | DIASTOLIC BLOOD PRESSURE: 86 MMHG | BODY MASS INDEX: 36.91 KG/M2 | WEIGHT: 221.8 LBS | HEART RATE: 96 BPM

## 2025-06-11 DIAGNOSIS — Z3A.36 36 WEEKS GESTATION OF PREGNANCY: Primary | ICD-10-CM

## 2025-06-11 DIAGNOSIS — O99.213 OBESITY AFFECTING PREGNANCY IN THIRD TRIMESTER, UNSPECIFIED OBESITY TYPE: ICD-10-CM

## 2025-06-11 DIAGNOSIS — Z3A.36 36 WEEKS GESTATION OF PREGNANCY: ICD-10-CM

## 2025-06-11 DIAGNOSIS — Z34.90 THIRD PREGNANCY: ICD-10-CM

## 2025-06-11 DIAGNOSIS — O24.410 DIET CONTROLLED GESTATIONAL DIABETES MELLITUS (GDM), ANTEPARTUM: ICD-10-CM

## 2025-06-11 DIAGNOSIS — O09.93 HRP (HIGH RISK PREGNANCY), THIRD TRIMESTER: ICD-10-CM

## 2025-06-11 PROCEDURE — 59025 FETAL NON-STRESS TEST: CPT | Performed by: STUDENT IN AN ORGANIZED HEALTH CARE EDUCATION/TRAINING PROGRAM

## 2025-06-11 PROCEDURE — 99213 OFFICE O/P EST LOW 20 MIN: CPT | Performed by: STUDENT IN AN ORGANIZED HEALTH CARE EDUCATION/TRAINING PROGRAM

## 2025-06-11 NOTE — PROGRESS NOTES
Prenatal Visit    Che Og is a 20 y.o. female  at 36w1d    The patient was seen and evaluated. She has no complaints. She reports positive fetal movements. She denies contractions, vaginal bleeding and leakage of fluid. Signs and symptoms of labor and pre-eclampsia were reviewed with the patient in detail. She is to report any of these if they occur. She currently denies any signs or symptoms of pre-clampsia including headache, vision changes, RUQ pain.    The patient is Rh positive and Rhogam is not indicated in this pregnancy.  The patient already received  the T-Dap Vaccine (27-36 weeks) this pregnancy.     The problem list reflects the active issues addressed during today's visit.    Allergies:  No Known Allergies    Vitals:  BP: 112/86  Weight - Scale: 100.6 kg (221 lb 12.8 oz)  Pulse: 96  Patient Position: Sitting  Albumin: Negative  Glucose: Negative  Fetal HR: 130  Movement: Present     PRENATAL LAB RESULTS:   Blood Type/Rh: A pos  Antibody Screen: negative  Hemoglobin, Hematocrit, Platelets: 12.9/39.8/260  Rubella: immune  T. Pallidum, IgG: non-reactive  Hepatitis B Surface Antigen: non-reactive   Hepatitis C Antibody: non-reactive   HIV: non-reactive   Gonorrhea: negative  Chlamydia: negative  Urine culture: negative, date: 24  Urine Drug Screen: negative  Group B Strep:  ** 25     28wk 1 hour Glucose Tolerance Test: 159  28wk 3 hour Glucose Tolerance Test: 91/211/180/117     Carrier Screen: SMA carrier  MSAFP: negative  Non-Invasive Prenatal Testing: low risk for aneuploidy  Anatomy US: posterior circumvallate placenta, 3VC normal insertion, female in breech presentation, normal fetal anatomy     Tdap: 25  Flu shot: discussed  Last Pap: n/a    NST:   Fetal heart rate baseline: 130, moderate variability, accelerations present, decelerations absent  Bandana: rare contractions    The tracing has been reviewed and is considered reactive.     Assessment & Plan:  Che Og is

## 2025-06-14 LAB
MICROORGANISM SPEC CULT: NORMAL
SERVICE CMNT-IMP: NORMAL
SPECIMEN DESCRIPTION: NORMAL

## 2025-06-16 ENCOUNTER — RESULTS FOLLOW-UP (OUTPATIENT)
Dept: OBGYN CLINIC | Age: 20
End: 2025-06-16

## 2025-06-16 ENCOUNTER — ROUTINE PRENATAL (OUTPATIENT)
Age: 20
End: 2025-06-16
Payer: MEDICAID

## 2025-06-16 VITALS
SYSTOLIC BLOOD PRESSURE: 121 MMHG | HEART RATE: 82 BPM | DIASTOLIC BLOOD PRESSURE: 70 MMHG | TEMPERATURE: 98 F | RESPIRATION RATE: 16 BRPM | HEIGHT: 65 IN | WEIGHT: 225.31 LBS | BODY MASS INDEX: 37.54 KG/M2

## 2025-06-16 DIAGNOSIS — O24.410 DIET CONTROLLED GESTATIONAL DIABETES MELLITUS (GDM), ANTEPARTUM: ICD-10-CM

## 2025-06-16 DIAGNOSIS — O09.899 HIGH RISK TEEN PREGNANCY, ANTEPARTUM: ICD-10-CM

## 2025-06-16 DIAGNOSIS — Z34.90 THIRD PREGNANCY: ICD-10-CM

## 2025-06-16 DIAGNOSIS — O43.119 ANTEPARTUM PLACENTA CIRCUMVALLATA: ICD-10-CM

## 2025-06-16 DIAGNOSIS — O99.333 PREGNANCY COMPLICATED BY TOBACCO USE IN THIRD TRIMESTER: ICD-10-CM

## 2025-06-16 DIAGNOSIS — Z14.8 GENETIC CARRIER: ICD-10-CM

## 2025-06-16 DIAGNOSIS — O99.810 ABNORMAL GLUCOSE TOLERANCE TEST (GTT) DURING PREGNANCY, ANTEPARTUM: ICD-10-CM

## 2025-06-16 DIAGNOSIS — Z3A.36 36 WEEKS GESTATION OF PREGNANCY: Primary | ICD-10-CM

## 2025-06-16 PROCEDURE — 76815 OB US LIMITED FETUS(S): CPT | Performed by: OBSTETRICS & GYNECOLOGY

## 2025-06-16 PROCEDURE — 99212 OFFICE O/P EST SF 10 MIN: CPT | Performed by: OBSTETRICS & GYNECOLOGY

## 2025-06-16 PROCEDURE — 99213 OFFICE O/P EST LOW 20 MIN: CPT | Performed by: OBSTETRICS & GYNECOLOGY

## 2025-06-16 PROCEDURE — 76819 FETAL BIOPHYS PROFIL W/O NST: CPT | Performed by: OBSTETRICS & GYNECOLOGY

## 2025-06-16 NOTE — PROGRESS NOTES
[facility] MATERNAL FETAL MED   [facility] MATERNAL FETAL MED  [address]  [place] [address]  Dept: [phone]     2025    RE:  Che Og     : 2005   AGE: 20 y.o.     Dear Dr. Keen,    Thank you for allowing me to see Che Og.  As I'm sure you will recall, Che Og is a 20 y.o. [id number]'s last menstrual period was 10/01/2024. Estimated Date of Delivery: 25 at 36w6d seen in our office today for the following:    REASON FOR VISIT:  BPP,Diabetic Management    Patient Active Problem List    Diagnosis Date Noted    36 weeks gestation of pregnancy 2025    Diet controlled gestational diabetes mellitus (GDM), antepartum 2025    Elevated 1hr GTT 2025    Third pregnancy 2025    High risk teen pregnancy, antepartum 2025    Obesity affecting pregnancy 2025    Antepartum placenta circumvallata 2025    Tobacco use complicating pregnancy 2025    SMA Carrier 2025        PAST HISTORY:  OB History    Para Term  AB Living   3    2    SAB IAB Ectopic Molar Multiple Live Births   2           # Outcome Date GA Lbr Christoph/2nd Weight Sex Type Anes PTL Lv   3 Current            2 SAB            1 SAB                   MEDICAL:  History reviewed. No pertinent past medical history.     SURGICAL:  History reviewed. No pertinent surgical history.    ALLERGIES:    No Known Allergies      MEDICATIONS:    Current Outpatient Medications   Medication Sig Dispense Refill    blood glucose monitor kit and supplies Dispense sufficient amount for QID testing frequency plus additional to accommodate PRN testing needs. Dispense all needed supplies to include: monitor, strips, lancing device, lancets, control solutions, alcohol swabs. 1 kit 0    Prenatal Vit-Fe Fumarate-FA (PRENATAL VITAMIN) 27-0.8 MG TABS Take 1 tablet by mouth daily 90 tablet 3    doxyLAMINE

## 2025-06-18 ENCOUNTER — ROUTINE PRENATAL (OUTPATIENT)
Dept: OBGYN CLINIC | Age: 20
End: 2025-06-18
Payer: MEDICAID

## 2025-06-18 VITALS
WEIGHT: 223.4 LBS | HEART RATE: 100 BPM | BODY MASS INDEX: 37.18 KG/M2 | SYSTOLIC BLOOD PRESSURE: 102 MMHG | DIASTOLIC BLOOD PRESSURE: 80 MMHG

## 2025-06-18 DIAGNOSIS — O24.410 DIET CONTROLLED GESTATIONAL DIABETES MELLITUS (GDM), ANTEPARTUM: ICD-10-CM

## 2025-06-18 DIAGNOSIS — O09.93 HRP (HIGH RISK PREGNANCY), THIRD TRIMESTER: Primary | ICD-10-CM

## 2025-06-18 DIAGNOSIS — Z3A.37 37 WEEKS GESTATION OF PREGNANCY: ICD-10-CM

## 2025-06-18 DIAGNOSIS — O99.213 OBESITY AFFECTING PREGNANCY IN THIRD TRIMESTER, UNSPECIFIED OBESITY TYPE: ICD-10-CM

## 2025-06-18 PROBLEM — Z3A.36 36 WEEKS GESTATION OF PREGNANCY: Status: RESOLVED | Noted: 2025-06-16 | Resolved: 2025-06-18

## 2025-06-18 PROCEDURE — 99213 OFFICE O/P EST LOW 20 MIN: CPT | Performed by: STUDENT IN AN ORGANIZED HEALTH CARE EDUCATION/TRAINING PROGRAM

## 2025-06-18 PROCEDURE — 59025 FETAL NON-STRESS TEST: CPT | Performed by: STUDENT IN AN ORGANIZED HEALTH CARE EDUCATION/TRAINING PROGRAM

## 2025-06-18 NOTE — PROGRESS NOTES
Prenatal Visit    Che Og is a 20 y.o. female  at 37w1d    The patient was seen and evaluated. The patient has no complaints today. There was positive fetal movements. She denies painful contractions, vaginal bleeding and leakage of fluid. Signs and symptoms of labor were reviewed.  The S/S of Pre-Eclampsia were reviewed with the patient in detail. She is to report any of these if they occur. She currently denies any signs or symptoms of pre-eclampsia which include headache, vision changes, RUQ pain.    The patient is Rh positive and Rhogam is not indicated in this pregnancy.  The patient already received  the T-Dap Vaccine (27-36 weeks) this pregnancy.    Allergies:  Patient has no known allergies.    Vitals and physical exam:  BP: 102/80  Weight - Scale: 101.3 kg (223 lb 6.4 oz)  Pulse: 100  Patient Position: Sitting  Albumin: Negative  Glucose: Negative  Fetal HR: 130  Movement: Present     PRENATAL LAB RESULTS:   Blood Type/Rh: A pos  Antibody Screen: negative  Hemoglobin, Hematocrit, Platelets: 12.9/39.8/260  Rubella: immune  T. Pallidum, IgG: non-reactive  Hepatitis B Surface Antigen: non-reactive   Hepatitis C Antibody: non-reactive   HIV: non-reactive   Gonorrhea: negative  Chlamydia: negative  Urine culture: negative, date: 24  Urine Drug Screen: negative  Group B Strep:  negative 25     28wk 1 hour Glucose Tolerance Test: 159  28wk 3 hour Glucose Tolerance Test: 91/211/180/117     Carrier Screen: SMA carrier  MSAFP: negative  Non-Invasive Prenatal Testing: low risk for aneuploidy  Anatomy US: posterior circumvallate placenta, 3VC normal insertion, female in breech presentation, normal fetal anatomy     Tdap: 25  Flu shot: discussed  Last Pap: n/a    NST:   Fetal heart rate baseline: 130, moderate variability, accelerations present, decelerations absent  Clarion: occasional contractions    The tracing has been reviewed and is considered reactive.    Assessment & Plan:  Che RENDON

## 2025-06-23 ENCOUNTER — ROUTINE PRENATAL (OUTPATIENT)
Age: 20
End: 2025-06-23
Payer: MEDICAID

## 2025-06-23 VITALS
TEMPERATURE: 97.7 F | HEART RATE: 87 BPM | RESPIRATION RATE: 16 BRPM | WEIGHT: 224.43 LBS | BODY MASS INDEX: 37.39 KG/M2 | SYSTOLIC BLOOD PRESSURE: 119 MMHG | DIASTOLIC BLOOD PRESSURE: 60 MMHG | HEIGHT: 65 IN

## 2025-06-23 DIAGNOSIS — Z34.90 THIRD PREGNANCY: ICD-10-CM

## 2025-06-23 DIAGNOSIS — O43.119 ANTEPARTUM PLACENTA CIRCUMVALLATA: ICD-10-CM

## 2025-06-23 DIAGNOSIS — Z14.8 GENETIC CARRIER: ICD-10-CM

## 2025-06-23 DIAGNOSIS — O99.213 OBESITY AFFECTING PREGNANCY IN THIRD TRIMESTER, UNSPECIFIED OBESITY TYPE: ICD-10-CM

## 2025-06-23 DIAGNOSIS — Z3A.37 37 WEEKS GESTATION OF PREGNANCY: Primary | ICD-10-CM

## 2025-06-23 DIAGNOSIS — O24.410 DIET CONTROLLED GESTATIONAL DIABETES MELLITUS (GDM), ANTEPARTUM: ICD-10-CM

## 2025-06-23 DIAGNOSIS — O99.333 PREGNANCY COMPLICATED BY TOBACCO USE IN THIRD TRIMESTER: ICD-10-CM

## 2025-06-23 PROCEDURE — 76815 OB US LIMITED FETUS(S): CPT | Performed by: OBSTETRICS & GYNECOLOGY

## 2025-06-23 PROCEDURE — 99213 OFFICE O/P EST LOW 20 MIN: CPT | Performed by: OBSTETRICS & GYNECOLOGY

## 2025-06-23 PROCEDURE — 99212 OFFICE O/P EST SF 10 MIN: CPT | Performed by: OBSTETRICS & GYNECOLOGY

## 2025-06-23 PROCEDURE — 76819 FETAL BIOPHYS PROFIL W/O NST: CPT | Performed by: OBSTETRICS & GYNECOLOGY

## 2025-06-23 NOTE — PROGRESS NOTES
Please refer to attached ultrasound report for doctor's evaluation of the clinical information obtained by vital signs, ultrasound, and/or non-stress test along with management recommendation.  
    Attends Synagogue Services: Never     Active Member of Clubs or Organizations: No     Attends Club or Organization Meetings: Never     Marital Status: Living with partner   Intimate Partner Violence: Not At Risk (12/17/2024)    Humiliation, Afraid, Rape, and Kick questionnaire     Fear of Current or Ex-Partner: No     Emotionally Abused: No     Physically Abused: No     Sexually Abused: No   Housing Stability: Unknown (12/17/2024)    Housing Stability Vital Sign     Unable to Pay for Housing in the Last Year: No     Homeless in the Last Year: No          FAMILY MEDICAL HISTORY:   Family History   Problem Relation Age of Onset    Heart Attack Paternal Grandfather     COPD Paternal Grandmother     Other Father         stomach ulcers    Heart Attack Mother     Hearing Impairment Mother     Asthma Brother           PHYSICAL EXAMINATION:  /60   Pulse 87   Temp 97.7 °F (36.5 °C)   Resp 16   Ht 1.651 m (5' 5\")   Wt 101.8 kg (224 lb 6.9 oz)   LMP 10/01/2024   Body mass index is 37.35 kg/m².    Urine dipstick:  No results found for this visit on 06/23/25.     IMPRESSION:  1. Waldrop fetus in a cephalic presentation. Fetal motion and cardiac motion is seen and appears normal.  2. The placenta is fundal. The amniotic fluid is normal.  3. Biophysical profile is 8/8.   4. Circumvallate placentation is again observed.   5.  I reviewed her blood sugars she is doing an excellent job maintaining blood sugar control.  No changes need to be made in her diabetic regimen.    RECOMMENDATIONS:  Each of the recommendations were discussed with the patient:  1.  Continue weekly antepartum testing including nonstress test.  2.  I agree with plans to deliver next week.  3.  Follow-up would be otherwise as clinically indicated.    The patient is to continue to follow with you in your office for ongoing obstetric care.     PLAN:    As noted above or sooner prn.    Sincerely,        Jett Beard MD    I spent 25 minutes of

## 2025-06-25 ENCOUNTER — ROUTINE PRENATAL (OUTPATIENT)
Dept: OBGYN CLINIC | Age: 20
End: 2025-06-25
Payer: MEDICAID

## 2025-06-25 VITALS
BODY MASS INDEX: 37.18 KG/M2 | SYSTOLIC BLOOD PRESSURE: 102 MMHG | DIASTOLIC BLOOD PRESSURE: 80 MMHG | HEART RATE: 92 BPM | WEIGHT: 223.4 LBS

## 2025-06-25 DIAGNOSIS — O99.213 OBESITY AFFECTING PREGNANCY IN THIRD TRIMESTER, UNSPECIFIED OBESITY TYPE: ICD-10-CM

## 2025-06-25 DIAGNOSIS — O24.410 DIET CONTROLLED GESTATIONAL DIABETES MELLITUS (GDM), ANTEPARTUM: ICD-10-CM

## 2025-06-25 DIAGNOSIS — O09.93 HRP (HIGH RISK PREGNANCY), THIRD TRIMESTER: Primary | ICD-10-CM

## 2025-06-25 DIAGNOSIS — Z3A.38 38 WEEKS GESTATION OF PREGNANCY: ICD-10-CM

## 2025-06-25 PROCEDURE — 99213 OFFICE O/P EST LOW 20 MIN: CPT | Performed by: CLINICAL NURSE SPECIALIST

## 2025-06-25 PROCEDURE — 59025 FETAL NON-STRESS TEST: CPT | Performed by: CLINICAL NURSE SPECIALIST

## 2025-06-25 NOTE — PROGRESS NOTES
Che Og is a 20 y.o. female 38w1d        OB History    Para Term  AB Living   3    2    SAB IAB Ectopic Molar Multiple Live Births   2           # Outcome Date GA Lbr Christoph/2nd Weight Sex Type Anes PTL Lv   3 Current            2 SAB            1 SAB                Vitals  BP: 102/80  Weight - Scale: 101.3 kg (223 lb 6.4 oz)  Pulse: 92  Patient Position: Sitting  Albumin: Negative  Glucose: Negative  Fundal Height (cm): 38 cm  Fetal HR: 130  Movement: Present      The patient was seen and evaluated. There was positive fetal movements. No contractions or leakage of fluid. Signs and symptoms of labor were reviewed.  The S/S of Pre-Eclampsia were reviewed with the patient in detail. She is to report any of these if they occur. She currently denies any of these.    The patient was instructed on fetal kick counts and was given a kick sheet to complete every 8 hours. She was instructed that the baby should move at a minimum of ten times within one hour after a meal. The patient was instructed to lay down on her left side twenty minutes after eating and count movements for up to one hour with a target value of ten movements.  She was instructed to notify the office if she did not make that target after two attempts or if after any attempt there was less than four movements.    The patient reports that the targets have been made Yes.    Induction scheduled on 2025 at 2200      CAROLINE by TVUS/LMP: 2025    High Risk Dx:  spinal muscular atrophy, vaping in pregnancy, obesity BMI>35, gestational diabetes, circumvallate placentation    Pre-pregnancy BMI: 29.62    PRENATAL LAB RESULTS:   Blood Type/Rh: A pos  Antibody Screen: negative  Hemoglobin, Hematocrit, Platelets: 12.9/39.8/260  Rubella: immune  T. Pallidum, IgG: non-reactive  Hepatitis B Surface Antigen: non-reactive   Hepatitis C Antibody: non-reactive   HIV: non-reactive   Gonorrhea: negative  Chlamydia: negative  Urine culture:

## 2025-06-30 ENCOUNTER — ROUTINE PRENATAL (OUTPATIENT)
Age: 20
End: 2025-06-30
Payer: MEDICAID

## 2025-06-30 VITALS
HEIGHT: 65 IN | BODY MASS INDEX: 36.49 KG/M2 | TEMPERATURE: 97.3 F | HEART RATE: 89 BPM | SYSTOLIC BLOOD PRESSURE: 121 MMHG | RESPIRATION RATE: 16 BRPM | DIASTOLIC BLOOD PRESSURE: 70 MMHG | WEIGHT: 219 LBS

## 2025-06-30 DIAGNOSIS — O24.410 DIET CONTROLLED GESTATIONAL DIABETES MELLITUS (GDM), ANTEPARTUM: ICD-10-CM

## 2025-06-30 DIAGNOSIS — Z14.8 GENETIC CARRIER: ICD-10-CM

## 2025-06-30 DIAGNOSIS — O43.119 ANTEPARTUM PLACENTA CIRCUMVALLATA: ICD-10-CM

## 2025-06-30 DIAGNOSIS — O99.213 OBESITY AFFECTING PREGNANCY IN THIRD TRIMESTER, UNSPECIFIED OBESITY TYPE: ICD-10-CM

## 2025-06-30 DIAGNOSIS — O99.333 PREGNANCY COMPLICATED BY TOBACCO USE IN THIRD TRIMESTER: ICD-10-CM

## 2025-06-30 DIAGNOSIS — Z34.90 THIRD PREGNANCY: ICD-10-CM

## 2025-06-30 DIAGNOSIS — O09.899 HIGH RISK TEEN PREGNANCY, ANTEPARTUM: ICD-10-CM

## 2025-06-30 DIAGNOSIS — Z3A.38 38 WEEKS GESTATION OF PREGNANCY: Primary | ICD-10-CM

## 2025-06-30 PROCEDURE — 76819 FETAL BIOPHYS PROFIL W/O NST: CPT | Performed by: OBSTETRICS & GYNECOLOGY

## 2025-06-30 PROCEDURE — 76805 OB US >/= 14 WKS SNGL FETUS: CPT | Performed by: OBSTETRICS & GYNECOLOGY

## 2025-06-30 PROCEDURE — 99212 OFFICE O/P EST SF 10 MIN: CPT | Performed by: OBSTETRICS & GYNECOLOGY

## 2025-06-30 PROCEDURE — 99213 OFFICE O/P EST LOW 20 MIN: CPT | Performed by: OBSTETRICS & GYNECOLOGY

## 2025-06-30 NOTE — PROGRESS NOTES
[facility] [place] MATERNAL FETAL MED  [address]  [place] [place] [address]  Dept: [phone]     2025    RE:  Che Og     : 2005   AGE: 20 y.o.     Dear Dr. Keen,    Thank you for allowing me to see Che Og.  As I'm sure you will recall, Che Og is a 20 y.o. [id number]'s last menstrual period was 10/01/2024. Estimated Date of Delivery: 25 at 38w6d seen in our office today for the following:    REASON FOR VISIT: Growth, BPP, diabetic management    Patient Active Problem List    Diagnosis Date Noted    38 weeks gestation of pregnancy 2025    Diet controlled gestational diabetes mellitus (GDM), antepartum 2025    Elevated 1hr GTT 2025    Third pregnancy 2025    High risk teen pregnancy, antepartum 2025    Obesity affecting pregnancy 2025    Antepartum placenta circumvallata 2025    Tobacco use complicating pregnancy 2025    SMA Carrier 2025        PAST HISTORY:  OB History    Para Term  AB Living   3    2    SAB IAB Ectopic Molar Multiple Live Births   2           # Outcome Date GA Lbr Christoph/2nd Weight Sex Type Anes PTL Lv   3 Current            2 SAB            1 SAB                   MEDICAL:  No past medical history on file.     SURGICAL:  No past surgical history on file.    ALLERGIES:    No Known Allergies      MEDICATIONS:    Current Outpatient Medications   Medication Sig Dispense Refill    blood glucose monitor kit and supplies Dispense sufficient amount for QID testing frequency plus additional to accommodate PRN testing needs. Dispense all needed supplies to include: monitor, strips, lancing device, lancets, control solutions, alcohol swabs. 1 kit 0    Prenatal Vit-Fe Fumarate-FA (PRENATAL VITAMIN) 27-0.8 MG TABS Take 1 tablet by mouth daily 90 tablet 3    doxyLAMINE succinate (GNP SLEEP AID) 25 MG tablet Take 1 tablet by mouth

## 2025-07-01 ENCOUNTER — APPOINTMENT (OUTPATIENT)
Dept: LABOR AND DELIVERY | Age: 20
DRG: 560 | End: 2025-07-01
Payer: MEDICAID

## 2025-07-01 ENCOUNTER — HOSPITAL ENCOUNTER (INPATIENT)
Age: 20
LOS: 3 days | Discharge: HOME | DRG: 560 | End: 2025-07-04
Attending: OBSTETRICS & GYNECOLOGY | Admitting: OBSTETRICS & GYNECOLOGY
Payer: MEDICAID

## 2025-07-01 PROBLEM — Z3A.39 39 WEEKS GESTATION OF PREGNANCY: Status: ACTIVE | Noted: 2025-07-01

## 2025-07-01 LAB
ABO + RH BLD: NORMAL
AMPHET UR QL SCN: NEGATIVE
ARM BAND NUMBER: NORMAL
BARBITURATES UR QL SCN: NEGATIVE
BASOPHILS # BLD: 0.05 K/UL (ref 0–0.2)
BASOPHILS NFR BLD: 0 % (ref 0–2)
BENZODIAZ UR QL: NEGATIVE
BLOOD BANK SAMPLE EXPIRATION: NORMAL
BLOOD GROUP ANTIBODIES SERPL: NEGATIVE
CANNABINOIDS UR QL SCN: NEGATIVE
COCAINE UR QL SCN: NEGATIVE
EOSINOPHIL # BLD: 0.18 K/UL (ref 0–0.44)
EOSINOPHILS RELATIVE PERCENT: 1 % (ref 1–4)
ERYTHROCYTE [DISTWIDTH] IN BLOOD BY AUTOMATED COUNT: 13.2 % (ref 11.8–14.4)
FENTANYL UR QL: NEGATIVE
HCT VFR BLD AUTO: 35.4 % (ref 36.3–47.1)
HGB BLD-MCNC: 11.7 G/DL (ref 11.9–15.1)
IMM GRANULOCYTES # BLD AUTO: 0.1 K/UL (ref 0–0.3)
IMM GRANULOCYTES NFR BLD: 1 %
LYMPHOCYTES NFR BLD: 2.59 K/UL (ref 1.2–5.2)
LYMPHOCYTES RELATIVE PERCENT: 16 % (ref 25–45)
MCH RBC QN AUTO: 30.2 PG (ref 25.2–33.5)
MCHC RBC AUTO-ENTMCNC: 33.1 G/DL (ref 28.4–34.8)
MCV RBC AUTO: 91.2 FL (ref 82.6–102.9)
METHADONE UR QL: NEGATIVE
MONOCYTES NFR BLD: 0.97 K/UL (ref 0.1–1.4)
MONOCYTES NFR BLD: 6 % (ref 2–8)
NEUTROPHILS NFR BLD: 76 % (ref 34–64)
NEUTS SEG NFR BLD: 12.79 K/UL (ref 1.8–8)
NRBC BLD-RTO: 0 PER 100 WBC
OPIATES UR QL SCN: NEGATIVE
OXYCODONE UR QL SCN: NEGATIVE
PCP UR QL SCN: NEGATIVE
PLATELET # BLD AUTO: 237 K/UL (ref 138–453)
PMV BLD AUTO: 9.8 FL (ref 8.1–13.5)
RBC # BLD AUTO: 3.88 M/UL (ref 3.95–5.11)
T PALLIDUM AB SER QL IA: NONREACTIVE
TEST INFORMATION: NORMAL
WBC OTHER # BLD: 16.7 K/UL (ref 4.5–13.5)

## 2025-07-01 PROCEDURE — 1220000000 HC SEMI PRIVATE OB R&B

## 2025-07-01 PROCEDURE — 80307 DRUG TEST PRSMV CHEM ANLYZR: CPT

## 2025-07-01 PROCEDURE — 86780 TREPONEMA PALLIDUM: CPT

## 2025-07-01 PROCEDURE — 86901 BLOOD TYPING SEROLOGIC RH(D): CPT

## 2025-07-01 PROCEDURE — 86850 RBC ANTIBODY SCREEN: CPT

## 2025-07-01 PROCEDURE — 86900 BLOOD TYPING SEROLOGIC ABO: CPT

## 2025-07-01 PROCEDURE — 85025 COMPLETE CBC W/AUTO DIFF WBC: CPT

## 2025-07-01 PROCEDURE — 2500000003 HC RX 250 WO HCPCS

## 2025-07-01 PROCEDURE — 3E0P7VZ INTRODUCTION OF HORMONE INTO FEMALE REPRODUCTIVE, VIA NATURAL OR ARTIFICIAL OPENING: ICD-10-PCS | Performed by: STUDENT IN AN ORGANIZED HEALTH CARE EDUCATION/TRAINING PROGRAM

## 2025-07-01 RX ORDER — SODIUM CHLORIDE, SODIUM LACTATE, POTASSIUM CHLORIDE, AND CALCIUM CHLORIDE .6; .31; .03; .02 G/100ML; G/100ML; G/100ML; G/100ML
500 INJECTION, SOLUTION INTRAVENOUS PRN
Status: DISCONTINUED | OUTPATIENT
Start: 2025-07-01 | End: 2025-07-03

## 2025-07-01 RX ORDER — ACETAMINOPHEN 500 MG
1000 TABLET ORAL EVERY 6 HOURS PRN
Status: DISCONTINUED | OUTPATIENT
Start: 2025-07-01 | End: 2025-07-03

## 2025-07-01 RX ORDER — SODIUM CHLORIDE, SODIUM LACTATE, POTASSIUM CHLORIDE, CALCIUM CHLORIDE 600; 310; 30; 20 MG/100ML; MG/100ML; MG/100ML; MG/100ML
INJECTION, SOLUTION INTRAVENOUS CONTINUOUS
Status: DISCONTINUED | OUTPATIENT
Start: 2025-07-01 | End: 2025-07-03

## 2025-07-01 RX ORDER — SODIUM CHLORIDE 0.9 % (FLUSH) 0.9 %
5-40 SYRINGE (ML) INJECTION PRN
Status: DISCONTINUED | OUTPATIENT
Start: 2025-07-01 | End: 2025-07-03

## 2025-07-01 RX ORDER — SODIUM CHLORIDE, SODIUM LACTATE, POTASSIUM CHLORIDE, AND CALCIUM CHLORIDE .6; .31; .03; .02 G/100ML; G/100ML; G/100ML; G/100ML
1000 INJECTION, SOLUTION INTRAVENOUS PRN
Status: DISCONTINUED | OUTPATIENT
Start: 2025-07-01 | End: 2025-07-03

## 2025-07-01 RX ORDER — SODIUM CHLORIDE 0.9 % (FLUSH) 0.9 %
5-40 SYRINGE (ML) INJECTION EVERY 12 HOURS SCHEDULED
Status: DISCONTINUED | OUTPATIENT
Start: 2025-07-01 | End: 2025-07-03

## 2025-07-01 RX ORDER — SODIUM CHLORIDE 9 MG/ML
INJECTION, SOLUTION INTRAVENOUS PRN
Status: DISCONTINUED | OUTPATIENT
Start: 2025-07-01 | End: 2025-07-03

## 2025-07-01 RX ADMIN — SODIUM CHLORIDE, PRESERVATIVE FREE 10 ML: 5 INJECTION INTRAVENOUS at 22:50

## 2025-07-02 ENCOUNTER — ANESTHESIA EVENT (OUTPATIENT)
Dept: LABOR AND DELIVERY | Age: 20
End: 2025-07-02
Payer: MEDICAID

## 2025-07-02 ENCOUNTER — ANESTHESIA (OUTPATIENT)
Dept: LABOR AND DELIVERY | Age: 20
End: 2025-07-02
Payer: MEDICAID

## 2025-07-02 LAB
GLUCOSE BLD-MCNC: 101 MG/DL (ref 65–105)
GLUCOSE BLD-MCNC: 70 MG/DL (ref 65–105)
GLUCOSE BLD-MCNC: 81 MG/DL (ref 65–105)
GLUCOSE BLD-MCNC: 98 MG/DL (ref 65–105)

## 2025-07-02 PROCEDURE — 2580000003 HC RX 258

## 2025-07-02 PROCEDURE — 82947 ASSAY GLUCOSE BLOOD QUANT: CPT

## 2025-07-02 PROCEDURE — 51701 INSERT BLADDER CATHETER: CPT

## 2025-07-02 PROCEDURE — 7200000001 HC VAGINAL DELIVERY

## 2025-07-02 PROCEDURE — 6370000000 HC RX 637 (ALT 250 FOR IP)

## 2025-07-02 PROCEDURE — 0HQ9XZZ REPAIR PERINEUM SKIN, EXTERNAL APPROACH: ICD-10-PCS | Performed by: STUDENT IN AN ORGANIZED HEALTH CARE EDUCATION/TRAINING PROGRAM

## 2025-07-02 PROCEDURE — 10907ZC DRAINAGE OF AMNIOTIC FLUID, THERAPEUTIC FROM PRODUCTS OF CONCEPTION, VIA NATURAL OR ARTIFICIAL OPENING: ICD-10-PCS | Performed by: STUDENT IN AN ORGANIZED HEALTH CARE EDUCATION/TRAINING PROGRAM

## 2025-07-02 PROCEDURE — 6360000002 HC RX W HCPCS: Performed by: NURSE ANESTHETIST, CERTIFIED REGISTERED

## 2025-07-02 PROCEDURE — 6360000002 HC RX W HCPCS

## 2025-07-02 PROCEDURE — 1220000000 HC SEMI PRIVATE OB R&B

## 2025-07-02 PROCEDURE — 3700000025 EPIDURAL BLOCK: Performed by: ANESTHESIOLOGY

## 2025-07-02 PROCEDURE — 59409 OBSTETRICAL CARE: CPT | Performed by: STUDENT IN AN ORGANIZED HEALTH CARE EDUCATION/TRAINING PROGRAM

## 2025-07-02 RX ORDER — ROPIVACAINE HYDROCHLORIDE 2 MG/ML
INJECTION, SOLUTION EPIDURAL; INFILTRATION; PERINEURAL
Status: COMPLETED
Start: 2025-07-02 | End: 2025-07-02

## 2025-07-02 RX ORDER — ONDANSETRON 2 MG/ML
4 INJECTION INTRAMUSCULAR; INTRAVENOUS EVERY 6 HOURS PRN
Status: DISCONTINUED | OUTPATIENT
Start: 2025-07-02 | End: 2025-07-03

## 2025-07-02 RX ORDER — ROPIVACAINE HYDROCHLORIDE 2 MG/ML
10 INJECTION, SOLUTION EPIDURAL; INFILTRATION; PERINEURAL ONCE
Status: DISCONTINUED | OUTPATIENT
Start: 2025-07-02 | End: 2025-07-02

## 2025-07-02 RX ORDER — SODIUM CHLORIDE 0.9 % (FLUSH) 0.9 %
5-40 SYRINGE (ML) INJECTION EVERY 12 HOURS SCHEDULED
Status: DISCONTINUED | OUTPATIENT
Start: 2025-07-02 | End: 2025-07-03

## 2025-07-02 RX ORDER — MORPHINE SULFATE 10 MG/ML
10 INJECTION INTRAVENOUS ONCE
Refills: 0 | Status: COMPLETED | OUTPATIENT
Start: 2025-07-02 | End: 2025-07-02

## 2025-07-02 RX ORDER — SODIUM CHLORIDE 9 MG/ML
INJECTION, SOLUTION INTRAVENOUS PRN
Status: DISCONTINUED | OUTPATIENT
Start: 2025-07-02 | End: 2025-07-03

## 2025-07-02 RX ORDER — SODIUM CHLORIDE 0.9 % (FLUSH) 0.9 %
5-40 SYRINGE (ML) INJECTION PRN
Status: DISCONTINUED | OUTPATIENT
Start: 2025-07-02 | End: 2025-07-03

## 2025-07-02 RX ORDER — DEXTROSE MONOHYDRATE 50 MG/ML
100 INJECTION, SOLUTION INTRAVENOUS PRN
Status: DISCONTINUED | OUTPATIENT
Start: 2025-07-02 | End: 2025-07-03

## 2025-07-02 RX ORDER — ONDANSETRON 4 MG/1
4 TABLET, ORALLY DISINTEGRATING ORAL EVERY 6 HOURS PRN
Status: DISCONTINUED | OUTPATIENT
Start: 2025-07-02 | End: 2025-07-03

## 2025-07-02 RX ORDER — GLUCAGON 1 MG/ML
1 KIT INJECTION PRN
Status: DISCONTINUED | OUTPATIENT
Start: 2025-07-02 | End: 2025-07-03

## 2025-07-02 RX ORDER — SENNOSIDES 8.6 MG/1
1 TABLET ORAL 2 TIMES DAILY
Qty: 60 TABLET | Refills: 11 | Status: SHIPPED | OUTPATIENT
Start: 2025-07-02 | End: 2026-07-02

## 2025-07-02 RX ORDER — LIDOCAINE HYDROCHLORIDE 10 MG/ML
INJECTION, SOLUTION EPIDURAL; INFILTRATION; INTRACAUDAL; PERINEURAL
Status: DISCONTINUED | OUTPATIENT
Start: 2025-07-02 | End: 2025-07-02 | Stop reason: SDUPTHER

## 2025-07-02 RX ORDER — PROCHLORPERAZINE EDISYLATE 5 MG/ML
10 INJECTION INTRAMUSCULAR; INTRAVENOUS ONCE
Status: COMPLETED | OUTPATIENT
Start: 2025-07-02 | End: 2025-07-02

## 2025-07-02 RX ORDER — LIDOCAINE HYDROCHLORIDE 10 MG/ML
INJECTION, SOLUTION INFILTRATION; PERINEURAL
Status: DISCONTINUED
Start: 2025-07-02 | End: 2025-07-03 | Stop reason: WASHOUT

## 2025-07-02 RX ORDER — LIDOCAINE HYDROCHLORIDE AND EPINEPHRINE 15; 5 MG/ML; UG/ML
INJECTION, SOLUTION EPIDURAL
Status: DISCONTINUED | OUTPATIENT
Start: 2025-07-02 | End: 2025-07-02 | Stop reason: SDUPTHER

## 2025-07-02 RX ORDER — ROPIVACAINE HYDROCHLORIDE 2 MG/ML
INJECTION, SOLUTION EPIDURAL; INFILTRATION; PERINEURAL
Status: DISCONTINUED | OUTPATIENT
Start: 2025-07-02 | End: 2025-07-02 | Stop reason: SDUPTHER

## 2025-07-02 RX ORDER — IBUPROFEN 600 MG/1
600 TABLET, FILM COATED ORAL EVERY 6 HOURS PRN
Qty: 60 TABLET | Refills: 0 | Status: SHIPPED | OUTPATIENT
Start: 2025-07-02

## 2025-07-02 RX ORDER — ACETAMINOPHEN 500 MG
1000 TABLET ORAL EVERY 6 HOURS PRN
Qty: 60 TABLET | Refills: 0 | Status: SHIPPED | OUTPATIENT
Start: 2025-07-02 | End: 2025-08-01

## 2025-07-02 RX ORDER — TRANEXAMIC ACID 10 MG/ML
INJECTION, SOLUTION INTRAVENOUS
Status: DISCONTINUED
Start: 2025-07-02 | End: 2025-07-03 | Stop reason: WASHOUT

## 2025-07-02 RX ORDER — NALOXONE HYDROCHLORIDE 0.4 MG/ML
INJECTION, SOLUTION INTRAMUSCULAR; INTRAVENOUS; SUBCUTANEOUS PRN
Status: DISCONTINUED | OUTPATIENT
Start: 2025-07-02 | End: 2025-07-03

## 2025-07-02 RX ORDER — INSULIN LISPRO 100 [IU]/ML
0-4 INJECTION, SOLUTION INTRAVENOUS; SUBCUTANEOUS PRN
Status: DISCONTINUED | OUTPATIENT
Start: 2025-07-02 | End: 2025-07-03

## 2025-07-02 RX ADMIN — Medication 25 MCG: at 01:01

## 2025-07-02 RX ADMIN — SODIUM CHLORIDE, SODIUM LACTATE, POTASSIUM CHLORIDE, AND CALCIUM CHLORIDE: .6; .31; .03; .02 INJECTION, SOLUTION INTRAVENOUS at 13:00

## 2025-07-02 RX ADMIN — SODIUM CHLORIDE, SODIUM LACTATE, POTASSIUM CHLORIDE, AND CALCIUM CHLORIDE: .6; .31; .03; .02 INJECTION, SOLUTION INTRAVENOUS at 17:58

## 2025-07-02 RX ADMIN — PROCHLORPERAZINE EDISYLATE 10 MG: 5 INJECTION INTRAMUSCULAR; INTRAVENOUS at 16:29

## 2025-07-02 RX ADMIN — LIDOCAINE HYDROCHLORIDE 2 ML: 10 INJECTION, SOLUTION EPIDURAL; INFILTRATION; INTRACAUDAL; PERINEURAL at 17:46

## 2025-07-02 RX ADMIN — SODIUM CHLORIDE, SODIUM LACTATE, POTASSIUM CHLORIDE, AND CALCIUM CHLORIDE: .6; .31; .03; .02 INJECTION, SOLUTION INTRAVENOUS at 22:08

## 2025-07-02 RX ADMIN — SODIUM CHLORIDE, SODIUM LACTATE, POTASSIUM CHLORIDE, AND CALCIUM CHLORIDE: .6; .31; .03; .02 INJECTION, SOLUTION INTRAVENOUS at 05:17

## 2025-07-02 RX ADMIN — LIDOCAINE HYDROCHLORIDE,EPINEPHRINE BITARTRATE 5 ML: 15; .005 INJECTION, SOLUTION EPIDURAL; INFILTRATION; INTRACAUDAL; PERINEURAL at 17:49

## 2025-07-02 RX ADMIN — MORPHINE SULFATE 10 MG: 10 INJECTION INTRAVENOUS at 16:29

## 2025-07-02 RX ADMIN — Medication 88 ML: at 17:56

## 2025-07-02 RX ADMIN — Medication 10 ML/HR: at 17:57

## 2025-07-02 RX ADMIN — Medication 1 MILLI-UNITS/MIN: at 05:18

## 2025-07-02 RX ADMIN — Medication 166.7 ML: at 23:17

## 2025-07-02 ASSESSMENT — PAIN DESCRIPTION - LOCATION
LOCATION: ABDOMEN;BUTTOCKS;BACK
LOCATION: ABDOMEN;BACK;BUTTOCKS

## 2025-07-02 ASSESSMENT — PAIN SCALES - GENERAL
PAINLEVEL_OUTOF10: 8
PAINLEVEL_OUTOF10: 9

## 2025-07-02 NOTE — ANESTHESIA PRE PROCEDURE
A POSITIVE 07/01/2025    LABANTI NEGATIVE 07/01/2025       Drug/Infectious Status (If Applicable):  Lab Results   Component Value Date/Time    HEPCAB NONREACTIVE 12/17/2024 11:10 PM       COVID-19 Screening (If Applicable): No results found for: \"COVID19\"        Anesthesia Evaluation  Patient summary reviewed   no history of anesthetic complications:   Airway: Mallampati: II  TM distance: >3 FB   Neck ROM: full  Mouth opening: > = 3 FB   Dental: normal exam         Pulmonary:Negative Pulmonary ROS                              Cardiovascular:Negative CV ROS  Exercise tolerance: good (>4 METS)                    Neuro/Psych:   Negative Neuro/Psych ROS              GI/Hepatic/Renal: Neg GI/Hepatic/Renal ROS            Endo/Other:    (+) DiabetesType II DM.                 Abdominal:             Vascular: negative vascular ROS.         Other Findings:             Anesthesia Plan      epidural     ASA 2     (Poss GA if emergency CS)        Anesthetic plan and risks discussed with patient.                        KATHIE Villalpando - CRNA   7/2/2025

## 2025-07-02 NOTE — ANESTHESIA PROCEDURE NOTES
Epidural Block    Patient location during procedure: OB  Reason for block: labor epidural  Staffing  Performed: resident/CRNA   Resident/CRNA: Pito Lopez APRN - CRNA  Performed by: Pito Lopez APRN - CRNA  Authorized by: Tien Noriega MD    Epidural  Patient position: sitting  Prep: Betadine  Patient monitoring: continuous pulse ox and cardiac monitor  Approach: midline  Location: L3-4  Injection technique: RUTH saline  Provider prep: mask and sterile gloves  Needle  Needle type: Tuohy   Needle gauge: 17 G  Needle length: 6 in  Needle insertion depth: 7 cm  Catheter type: multi-orifice  Catheter at skin depth: 13 cm  Test dose: negativeCatheter Secured: tegaderm and tape  Assessment  Hemodynamics: stable  Attempts: 1  Outcomes: uncomplicated and patient tolerated procedure well  Preanesthetic Checklist  Completed: patient identified, IV checked, site marked, risks and benefits discussed, surgical/procedural consents, equipment checked, pre-op evaluation, timeout performed, anesthesia consent given, oxygen available, monitors applied/VS acknowledged, fire risk safety assessment completed and verbalized and blood product R/B/A discussed and consented

## 2025-07-02 NOTE — CARE COORDINATION
ANTEPARTUM NOTE    39 weeks gestation of pregnancy [Z3A.39]    Che was admitted to L&D on 7/1/25 for IOL due to GMDA1 @ 39w0d    OB GYN Provider: Dr. Keen    Will meet with patient after delivery to verify name/address/phone/insurance and discuss discharge planning.     Anticipate DC home 2 nights after vaginal delivery or 4 nights after C/S delivery as long as hemodynamically stable.

## 2025-07-02 NOTE — FLOWSHEET NOTE
Dangling at bedside.  SOP at bedside on couch resting.  Plan of care discussed.  Comfortable.  No requests or complaints. Plan of care discussed.

## 2025-07-02 NOTE — FLOWSHEET NOTE
Holding boyfriend's hand during contractions.  Stated may need something for pain next time I come in.  Support given.  Clear liquid diet at bedside.  Sitting up to drink it at this time.  Family supportive.

## 2025-07-02 NOTE — PLAN OF CARE
Problem: Chronic Conditions and Co-morbidities  Goal: Patient's chronic conditions and co-morbidity symptoms are monitored and maintained or improved  Outcome: Progressing     Problem: Vaginal Birth or  Section  Goal: Fetal and maternal status remain reassuring during the birth process  Description:  Birth OB-Pregnancy care plan goal which identifies if the fetal and maternal status remain reassuring during the birth process  Outcome: Progressing     Problem: Pain  Goal: Verbalizes/displays adequate comfort level or baseline comfort level  Outcome: Progressing

## 2025-07-02 NOTE — PROGRESS NOTES
Labor Pitocin Note    Che Og is a 20 y.o. female  at 39w1d    Pitocin Note.      Vital Signs:  Vitals:    25 0558 25 0650 25 0800 25 0915   BP: (!) 115/51 (!) 123/59 123/78 123/75   Pulse: 95 86 96 94   Resp: 16 18 18 18   Temp:   97.9 °F (36.6 °C)    TempSrc:       SpO2:   100%    Weight:       Height:             FHT: 155, moderate variability, accelerations present, decelerations absent  Contractions: irregular     Pitocin: @ 6 mu/min    Membranes: Intact  Scalp Electrode in place: absent  Intrauterine Pressure Catheter in Place: absent      Assessment/Plan:  Che Og is a 20 y.o. female  at 39w1d admitted for IOL 2/2 GDMA1   - GBS negative, No indication for GBS prophylaxis   - VSS,A febrile    - cEFM/TOCO   - Continue pitocin per protocol   - AROM when able.      Attending updated and in agreement with plan    Yanet Chvaarria DO  Ob/Gyn Resident  2025, 10:32 AM

## 2025-07-02 NOTE — H&P
affecting pregnancy 02/18/2025     Prepregnancy BMI 29.62      Antepartum placenta circumvallata 02/18/2025    Tobacco use complicating pregnancy 02/18/2025    SMA Carrier 02/18/2025     FOB given kit, will consider testing         Plan discussed with Dr. Cifuentes, who is agreeable.     Steroids given this admission: No    Risks, benefits, alternatives and possible complications have been discussed in detail with the patient. Admission, and post admission procedures and expectations were discussed in detail. All questions were answered.    Attending's Name: Dr. Esau Chris DO  Ob/Gyn Resident  7/2/2025, 1:45 AM

## 2025-07-02 NOTE — FLOWSHEET NOTE
Pt states her blood sugar is 86 and dropping. Requests some apple juice. Has some pretzels at her bedside as well

## 2025-07-02 NOTE — PROGRESS NOTES
Labor Progress Note    Che Og is a 20 y.o. female  at 39w1d  The patient was seen and examined. SVE performed pt tolerated well. Her pain is well controlled. She reports fetal movement is present, complains of contractions, denies loss of fluid, denies vaginal bleeding.       Vital Signs:  /78   Pulse 96   Temp 97.9 °F (36.6 °C)   Resp 18   Ht 1.626 m (5' 4\")   Wt 100.7 kg (222 lb)   LMP 10/01/2024   SpO2 100%   BMI 38.11 kg/m²       FHT: 130, moderate variability, accelerations present, decelerations absent  Contractions: regular, every 2-3 minutes    Chaperone for Intimate Exam: Chaperone was present for entire exam, Chaperone Name: FARZANEH Alexander  Cervical Exam: 3 cm dilated, 50 effaced, -1 station  Pitocin: @ 0 mu/min    Membranes: Intact  Scalp Electrode in place: absent  Intrauterine Pressure Catheter in Place: absent    Interventions: none    Assessment/Plan:  Che Og is a 20 y.o. female  at 39w1d admitted for IOL 2/2 GDMA1.    - GBS negative, No indication for GBS prophylaxis   - VSS    - cEFM/TOCO: Cat I FHT   -S/p cytotec 25mcg   -Pitocin per protocol    -Continue current care    GDMA1   -BG q4 hours   -OBISS   -CLD      Attending updated and in agreement with plan    Dc Chris DO  Ob/Gyn Resident  2025, 5:04 AM     Resident Physician Statement  I have discussed the case, including pertinent history and exam findings with the above resident. I have personally seen the patient. I agree with the assessment, plan and orders as documented. I have made changes to the above note as needed. I have discussed the case with above named attending.     Edward Peña MD  OB/GYN Resident  2025  8:41 AM

## 2025-07-02 NOTE — DISCHARGE SUMMARY
Obstetric Discharge Summary  [facility]    Patient Name: Che Og  Patient : 2005  Primary Care Physician: No primary care provider on file.  Admit Date: 2025    Principal Diagnosis: IUP at 39w0d, admitted for Induction of Labor 2/2 GDMA1       Her pregnancy has been complicated by:   Patient Active Problem List   Diagnosis    Third pregnancy    High risk teen pregnancy, antepartum    Obesity affecting pregnancy    Antepartum placenta circumvallata    Tobacco use complicating pregnancy    SMA Carrier    Elevated 1hr GTT    Diet controlled gestational diabetes mellitus (GDM), antepartum    38 weeks gestation of pregnancy    RH+/ RI/GBSneg    Diabetes mellitus (HCC)     25 F Apg 8/9, Wt 7#2       Infection Present?: No  Hospital Acquired: No    Surgical Operations & Procedures:  Analgesia: epidural  Delivery Type: Spontaneous Vaginal Delivery: See Labor and Delivery Summary   Laceration(s): First degree laceration.  Suture used for repair:  Vicryl 3.0.    Consultations: Anesthesia    Pertinent Findings & Procedures:   Che Og is a 20 y.o. female  at 39w0d admitted for IOL 2/2 GDMA1; received cytotec 25mcg PO x1, pitocin, AROM, morphine/compazine x1, AROM (forebag), Epidural      She delivered by spontaneous vaginal a Live Born infant on 25.       Information for the patient's :  Lenka, Girl Che [[id number]]   female   Birth Weight: 3.24 kg (7 lb 2.3 oz)    Apgars: 8 at 1 minute and 9 at 5 minutes.       Postpartum course: normal.      Course of patient: uncomplicated    Discharge to: Home    Readmission planned: no     Indication for 6 week PP 2 hour GTT?: yes    Eligible for 2 week PP virtual visit? yes      Recommendations on Discharge:     Medications:      Medication List        START taking these medications      acetaminophen 500 MG tablet  Commonly known as: TYLENOL  Take 2 tablets by mouth every 6 hours as needed for Pain     ibuprofen

## 2025-07-02 NOTE — PROGRESS NOTES
Labor Progress Note    Che Og is a 20 y.o. female  at 39w1d  The patient was seen and examined. Her pain is well controlled. She reports fetal movement is present, complains of contractions, complains of loss of fluid, complains of vaginal bleeding.       Forebag palpated on cervical exam. R/B/A to AROM discussed. SVE performed, patient /-1. Patient amenable to AROM at this time. [place] used, AROM performed revealing clear fluid. Patient tolerated procedure well.       Vital Signs:  Vitals:    25 1300 25 1401 25 1500 25 1602   BP: 127/71 (!) 144/76 119/68 127/71   Pulse: 89 88 86 89   Resp: 18 18 16 16   Temp:    98.5 °F (36.9 °C)   TempSrc:    Oral   SpO2:       Weight:       Height:           FHT: 140, moderate variability, accelerations present, decelerations absent  Contractions: regular, every 2-4 minutes    Chaperone for Intimate Exam: Chaperone was present for entire exam, Chaperone Name: FARZANEH Perez  Cervical Exam: 4 cm dilated, 50 effaced, -1 station  Pitocin: @ 14 mu/min    Membranes: Ruptured clear fluid  Scalp Electrode in place: absent  Intrauterine Pressure Catheter in Place: absent    Interventions: SVE, AROM of forebag    Assessment/Plan:  Che Og is a 20 y.o. female  at 39w1d admitted for IOL 2/2 GDMA1   - GBS negative, No indication for GBS prophylaxis  - VSS, afebrile    - cEFM and TOCO: cat 1 w/ regular contractions   - AROM (scant) @ 1145, AROM forebag (clr) @ 1612   - SVE %/-1   - s/p Cytotec 25 mcg PO x1   - Morphine/compazine x1 ordered for pain control   - Continue pitocin per protocol   - Continue to monitor    Attending updated and in agreement with plan    Natacha Davidson MD  Ob/Gyn Resident  2025, 4:15 PM          Attending Physician Statement      I have personally seen, evaluated and discussed the care of Che Og, including pertinent history and exam findings with the resident. I have reviewed and edited their

## 2025-07-03 PROCEDURE — 1220000000 HC SEMI PRIVATE OB R&B

## 2025-07-03 PROCEDURE — 6370000000 HC RX 637 (ALT 250 FOR IP)

## 2025-07-03 PROCEDURE — 2500000003 HC RX 250 WO HCPCS

## 2025-07-03 RX ORDER — ONDANSETRON 4 MG/1
4 TABLET, ORALLY DISINTEGRATING ORAL EVERY 6 HOURS PRN
Status: DISCONTINUED | OUTPATIENT
Start: 2025-07-03 | End: 2025-07-04 | Stop reason: HOSPADM

## 2025-07-03 RX ORDER — SODIUM CHLORIDE 0.9 % (FLUSH) 0.9 %
5-40 SYRINGE (ML) INJECTION EVERY 12 HOURS SCHEDULED
Status: DISCONTINUED | OUTPATIENT
Start: 2025-07-03 | End: 2025-07-04 | Stop reason: HOSPADM

## 2025-07-03 RX ORDER — SODIUM CHLORIDE 0.9 % (FLUSH) 0.9 %
5-40 SYRINGE (ML) INJECTION PRN
Status: DISCONTINUED | OUTPATIENT
Start: 2025-07-03 | End: 2025-07-04 | Stop reason: HOSPADM

## 2025-07-03 RX ORDER — SODIUM CHLORIDE 9 MG/ML
INJECTION, SOLUTION INTRAVENOUS PRN
Status: DISCONTINUED | OUTPATIENT
Start: 2025-07-03 | End: 2025-07-04 | Stop reason: HOSPADM

## 2025-07-03 RX ORDER — ONDANSETRON 2 MG/ML
4 INJECTION INTRAMUSCULAR; INTRAVENOUS EVERY 6 HOURS PRN
Status: DISCONTINUED | OUTPATIENT
Start: 2025-07-03 | End: 2025-07-04 | Stop reason: HOSPADM

## 2025-07-03 RX ORDER — BISACODYL 10 MG
10 SUPPOSITORY, RECTAL RECTAL DAILY PRN
Status: DISCONTINUED | OUTPATIENT
Start: 2025-07-03 | End: 2025-07-04 | Stop reason: HOSPADM

## 2025-07-03 RX ORDER — IBUPROFEN 600 MG/1
600 TABLET, FILM COATED ORAL EVERY 6 HOURS PRN
Status: DISCONTINUED | OUTPATIENT
Start: 2025-07-03 | End: 2025-07-03

## 2025-07-03 RX ORDER — IBUPROFEN 600 MG/1
600 TABLET, FILM COATED ORAL EVERY 6 HOURS
Status: DISCONTINUED | OUTPATIENT
Start: 2025-07-03 | End: 2025-07-04 | Stop reason: HOSPADM

## 2025-07-03 RX ORDER — LANOLIN
CREAM (ML) TOPICAL PRN
Status: DISCONTINUED | OUTPATIENT
Start: 2025-07-03 | End: 2025-07-04 | Stop reason: HOSPADM

## 2025-07-03 RX ORDER — SIMETHICONE 80 MG
80 TABLET,CHEWABLE ORAL EVERY 6 HOURS PRN
Status: DISCONTINUED | OUTPATIENT
Start: 2025-07-03 | End: 2025-07-04 | Stop reason: HOSPADM

## 2025-07-03 RX ORDER — SENNA AND DOCUSATE SODIUM 50; 8.6 MG/1; MG/1
1 TABLET, FILM COATED ORAL DAILY
Status: DISCONTINUED | OUTPATIENT
Start: 2025-07-03 | End: 2025-07-04 | Stop reason: HOSPADM

## 2025-07-03 RX ORDER — ACETAMINOPHEN 500 MG
1000 TABLET ORAL EVERY 6 HOURS PRN
Status: DISCONTINUED | OUTPATIENT
Start: 2025-07-03 | End: 2025-07-04 | Stop reason: HOSPADM

## 2025-07-03 RX ADMIN — SODIUM CHLORIDE, PRESERVATIVE FREE 10 ML: 5 INJECTION INTRAVENOUS at 21:42

## 2025-07-03 RX ADMIN — ACETAMINOPHEN 1000 MG: 500 TABLET ORAL at 01:06

## 2025-07-03 RX ADMIN — DOCUSATE SODIUM 50MG AND SENNOSIDES 8.6MG 1 TABLET: 8.6; 5 TABLET, FILM COATED ORAL at 08:55

## 2025-07-03 RX ADMIN — IBUPROFEN 600 MG: 600 TABLET, FILM COATED ORAL at 01:06

## 2025-07-03 RX ADMIN — ACETAMINOPHEN 1000 MG: 500 TABLET ORAL at 16:05

## 2025-07-03 RX ADMIN — ACETAMINOPHEN 1000 MG: 500 TABLET ORAL at 08:55

## 2025-07-03 RX ADMIN — SODIUM CHLORIDE, PRESERVATIVE FREE 10 ML: 5 INJECTION INTRAVENOUS at 08:56

## 2025-07-03 RX ADMIN — ACETAMINOPHEN 1000 MG: 500 TABLET ORAL at 21:42

## 2025-07-03 RX ADMIN — IBUPROFEN 600 MG: 600 TABLET ORAL at 08:55

## 2025-07-03 RX ADMIN — IBUPROFEN 600 MG: 600 TABLET ORAL at 21:41

## 2025-07-03 RX ADMIN — IBUPROFEN 600 MG: 600 TABLET ORAL at 16:05

## 2025-07-03 ASSESSMENT — PAIN SCALES - GENERAL
PAINLEVEL_OUTOF10: 2
PAINLEVEL_OUTOF10: 2
PAINLEVEL_OUTOF10: 3

## 2025-07-03 ASSESSMENT — PAIN DESCRIPTION - DESCRIPTORS
DESCRIPTORS: CRAMPING;SORE
DESCRIPTORS: CRAMPING
DESCRIPTORS: SORE;DISCOMFORT

## 2025-07-03 ASSESSMENT — PAIN DESCRIPTION - LOCATION
LOCATION: PERINEUM
LOCATION: ABDOMEN;PERINEUM
LOCATION: ABDOMEN

## 2025-07-03 NOTE — LACTATION NOTE
Refined baby's position at the right breast in cradle hold. Taught gathering. Baby latched and began feeding well. Mom noted it was comfortable. Reviewed length of feeds. Mom to call out for assistance as needed.

## 2025-07-03 NOTE — PROGRESS NOTES
CLINICAL PHARMACY NOTE: MEDS TO BEDS    Total # of Prescriptions Filled: 3   The following medications were delivered to the patient:  Tylenol  Senna  motrin    Additional Documentation:

## 2025-07-03 NOTE — FLOWSHEET NOTE
Patient admitted to room 749 from 709 via wheelchair.   Oriented to room and surroundings.  Plan of care reviewed.  Verbalized understanding.  Instructed on infant security and safe sleep practices.  Preventing falls education provided .The following handouts given: A New Beginning: Your Guide to Postpartum Care, Rounding, gs Security System,Babies Cry A lot, Safe Sleep, Security and Visitation Guidelines.   Call light placed within reach.

## 2025-07-03 NOTE — PROGRESS NOTES
Nursery   - Encourage ambulation   -motrin/tylenol   -continue pp care  Rh positive/Rubella immune  Breast feeding  -s/s of mastitis reviewed.    GDMA1   -Will need 2 hr GCT @ 6 week pp visit   Elevated Bpx1    -Normotensive overnight    -Pt. Has not met criteria for hypertensive disorder of pregnancy   -Denies s/s of PreE   -Will monitor bp closely   Continue post partum care    Counseling Completed:  Secondary Smoke risks and Sudden Infant Death Syndrome were reviewed with recommendations. Infant sleeping, \"back to sleep\" and avoidance of co-sleeping recommendations were reviewed.  Signs and Symptoms of Post Partum Depression were reviewed. The patient is to call if any occur.  Signs and symptoms of Mastitis were reviewed. The patient is to call if any occur for follow up.    Attending Physician: Dr. Osmani Chris DO  Ob/Gyn Resident   7/3/2025, 1:03 AM     Resident Physician Statement  I have discussed the case, including pertinent history and exam findings with the above resident. I have personally seen the patient. I agree with the assessment, plan and orders as documented. I have made changes to the above note as needed. I have discussed the case with above named attending.       Edward Peña MD  OB/GYN Resident  7/3/2025  6:15 AM           Attending Physician Statement      I have personally seen, evaluated and discussed the care of Che Og, including pertinent history and exam findings with the resident. I have reviewed and edited their note in the electronic medical record. The key elements of all parts of the encounter have been performed/reviewed by me.  I agree with the assessment, plan and orders as documented by the resident. (GC Modifier)    Vitals:    07/03/25 0131 07/03/25 0153 07/03/25 0211 07/03/25 0609   BP: 133/85 (!) 130/58 121/70 133/71   Pulse: (!) 102 99 93 89   Resp: 18 18 19 18   Temp:   98.1 °F (36.7 °C) 98.2 °F (36.8 °C)   TempSrc:   Oral Oral   SpO2:   99% 98%    Weight:       Height:          Patient overall doing well postpartum. Bleeding and vitals overall stable. Continue inpatient care at this time.    Stone Keen,

## 2025-07-03 NOTE — CARE COORDINATION
25  [facility]  Clinical Case Management Initial Discharge Planning   Written by: BALTAZAR BYRD RN    Patient Name: Che Og  Attending Provider: Dottie Cifuentes MD      Admit Date: 2025  MRN: [id number]                           : 2005    Patient Visit Status: Inpatient    OB: Dr. Keen    Writer met with Che Og and ALEXIS/YOANA Sinha at bedside and verified address, phone number and emergency contacts all correct.     Delivery Information:  25 @ 39w1d at 2312  Sex of : Female    Infant to WIN: Yes    Who lives in the home: ALEXIS Bolton/YOANA Kurtz and her mom  Barrier with transportation Home: No  Barrier with transportation to MD appointments: No  Ability to pay for medication(s) at discharge: Yes  DME: [facility] supplies  HC: None    Insurance: [facility] confirmed with Che Og and notified  must be added to insurance within 30 days of birth by contacting S. Che Og verbalized understanding    [place] Name on Birth Certificate: Miranda Sinha  Pediatrician: [facility]- encouraged her to call today as tomorrow is holiday    Anticipate DC home of couplet in private vehicle in 1-2 days status post vaginal delivery.    Case Management Services Information Letter provided in patient's post partum admission folder.    [facility] RISK OF UNPLANNED READMISSION 2.0             2.8 Total Score

## 2025-07-03 NOTE — CARE COORDINATION
Social Work     Sw reviewed medical record (current active problem list) and tox screens and found no current concerns.     Sw spoke with mom briefly to explain Sw role, inquire if any needs or concerns, and provide safe sleep education and discuss.  Mom denied any needs or questions and informs baby has a safe sleep environment (crib, bass, pnp).     Mom denied any current s/s of anxiety or depression and is aware to reach out to OB if any s/s occur after dc.     Mom reports a really good support system with friends and family and denied any current questions or needs.      Mom reports this is her 1st baby.       Mom states ped will be FCC.      Sw encouraged mom to reach out if any issues or concerns arise.

## 2025-07-03 NOTE — FLOWSHEET NOTE
Assisted to restroom. Gait slow and steady. Voids without difficulty. Mayi care instructions given and done per pt. Iv cont. Meets discharge criteria. To wc . To 749 with baby in arms.

## 2025-07-03 NOTE — LACTATION NOTE
Packet of breastfeeding information given. Mom noted that the baby isn't latching and feeding well on the right breast. Encouraged her to call out when baby alerts for a feeding. Reviewed feeding patterns and how to know baby is getting to the supply well.

## 2025-07-03 NOTE — PLAN OF CARE
Problem: Chronic Conditions and Co-morbidities  Goal: Patient's chronic conditions and co-morbidity symptoms are monitored and maintained or improved  Outcome: Progressing     Problem: Vaginal Birth or  Section  Goal: Fetal and maternal status remain reassuring during the birth process  Description:  Birth OB-Pregnancy care plan goal which identifies if the fetal and maternal status remain reassuring during the birth process  Outcome: Progressing     Problem: Pain  Goal: Verbalizes/displays adequate comfort level or baseline comfort level  Outcome: Progressing     Problem: Safety - Adult  Goal: Free from fall injury  Outcome: Progressing     Problem: Postpartum  Goal: Experiences normal postpartum course  Description:  Postpartum OB-Pregnancy care plan goal which identifies if the mother is experiencing a normal postpartum course  Outcome: Progressing  Goal: Appropriate maternal -  bonding  Description:  Postpartum OB-Pregnancy care plan goal which identifies if the mother and  are bonding appropriately  Outcome: Progressing  Goal: Establishment of infant feeding pattern  Description:  Postpartum OB-Pregnancy care plan goal which identifies if the mother is establishing a feeding pattern with their   Outcome: Progressing  Goal: Incisions, wounds, or drain sites healing without S/S of infection  Outcome: Progressing

## 2025-07-03 NOTE — ANESTHESIA POSTPROCEDURE EVALUATION
Department of Anesthesiology  Postprocedure Note    Patient: Che Og  MRN: [id number]  YOB: 2005  Date of evaluation: 7/3/2025    Procedure Summary       Date: 07/02/25 Room / Location:     Anesthesia Start: 1738 Anesthesia Stop: 2312    Procedure: Labor Analgesia Diagnosis:     Scheduled Providers:  Responsible Provider: Tien Noriega MD    Anesthesia Type: epidural ASA Status: 2            Anesthesia Type: No value filed.    Leann Phase I: Leann Score: 9    Leann Phase II:      Anesthesia Post Evaluation    Patient location during evaluation: floor  Patient participation: complete - patient participated  Level of consciousness: awake and alert  Pain score: 0  Airway patency: patent  Nausea & Vomiting: no nausea and no vomiting  Cardiovascular status: hemodynamically stable  Respiratory status: acceptable  Hydration status: euvolemic  Pain management: adequate        No notable events documented.

## 2025-07-03 NOTE — L&D DELIVERY NOTE
Vaginal Delivery Note  Department of Obstetrics and Gynecology  [facility]       Patient: Che Og   : 2005  MRN: [id number]   Date of delivery: 25     Pre-operative Diagnosis: Che Og  at 39w1d  Induction of Labor Secondary to Gestational Diabetes Mellitus 1A.  Elevated Blood Pressure x1  Placenta Circumvallata   Tobacco Use  SMA Carrier   BMI 36    Post-operative Diagnosis:    living infant female  Induction of Labor Secondary to Gestational Diabetes Mellitus 1A.  Elevated Blood Pressure x1  Placenta Circumvallata   Tobacco Use  SMA Carrier   BMI 36    Delivering Obstetrician & Assistant(s): Dr. Keen, Edward Peña MD PGY4; Dc Chris DO    Infant Information:   Information for the patient's :  Basia Og [[id number]]      Information for the patient's :  Basia Og [[id number]]        Apgar scores: 8 at 1 minute and 9 at 5 minutes.     Anesthesia:  epidural anesthesia    Application and Delivery:    She was known to be GBS negative.    The patient progressed well, received an epidural, became complete and felt the urge to push. After pushing with contractions the fetal head delivered Cephalic, left occiput anterior over an intact perineum, nuchal cord was present and reduced.  The anterior, then posterior shoulder delivered easily and atraumatically followed by the rest of the infant. Nose and mouth suctioned with bulb suction, infant was stimulated and dried. Cord was clamped and cut after one minute delayed cord clamping performed and infant was placed on maternal abdomen, and attended by Catherine MOY for evaluation. The delivery of the placenta was spontaneous and appeared intact and that the umbilical cord had three vessels noted. Pitocin was started. The vagina was swept of all clots and debris. The perineum and vagina were evaluated and a midline first degree perineal laceration was found and repaired in standard

## 2025-07-04 VITALS
WEIGHT: 222 LBS | TEMPERATURE: 98.1 F | DIASTOLIC BLOOD PRESSURE: 78 MMHG | BODY MASS INDEX: 37.9 KG/M2 | HEIGHT: 64 IN | SYSTOLIC BLOOD PRESSURE: 124 MMHG | HEART RATE: 96 BPM | OXYGEN SATURATION: 99 % | RESPIRATION RATE: 18 BRPM

## 2025-07-04 PROCEDURE — 6370000000 HC RX 637 (ALT 250 FOR IP)

## 2025-07-04 PROCEDURE — 2500000003 HC RX 250 WO HCPCS

## 2025-07-04 RX ADMIN — ACETAMINOPHEN 1000 MG: 500 TABLET ORAL at 03:49

## 2025-07-04 RX ADMIN — ACETAMINOPHEN 1000 MG: 500 TABLET ORAL at 10:21

## 2025-07-04 RX ADMIN — IBUPROFEN 600 MG: 600 TABLET ORAL at 10:20

## 2025-07-04 RX ADMIN — ACETAMINOPHEN 1000 MG: 500 TABLET ORAL at 18:11

## 2025-07-04 RX ADMIN — DOCUSATE SODIUM 50MG AND SENNOSIDES 8.6MG 1 TABLET: 8.6; 5 TABLET, FILM COATED ORAL at 10:21

## 2025-07-04 RX ADMIN — SODIUM CHLORIDE, PRESERVATIVE FREE 10 ML: 5 INJECTION INTRAVENOUS at 10:21

## 2025-07-04 RX ADMIN — IBUPROFEN 600 MG: 600 TABLET ORAL at 18:11

## 2025-07-04 RX ADMIN — IBUPROFEN 600 MG: 600 TABLET ORAL at 03:49

## 2025-07-04 ASSESSMENT — PAIN DESCRIPTION - DESCRIPTORS
DESCRIPTORS: CRAMPING
DESCRIPTORS: CRAMPING
DESCRIPTORS: DISCOMFORT;SORE;CRAMPING

## 2025-07-04 ASSESSMENT — PAIN DESCRIPTION - LOCATION
LOCATION: ABDOMEN
LOCATION: ABDOMEN

## 2025-07-04 ASSESSMENT — PAIN SCALES - GENERAL
PAINLEVEL_OUTOF10: 1
PAINLEVEL_OUTOF10: 2
PAINLEVEL_OUTOF10: 1

## 2025-07-04 ASSESSMENT — PAIN - FUNCTIONAL ASSESSMENT: PAIN_FUNCTIONAL_ASSESSMENT: ACTIVITIES ARE NOT PREVENTED

## 2025-07-04 ASSESSMENT — PAIN DESCRIPTION - ORIENTATION: ORIENTATION: MID

## 2025-07-04 NOTE — PROGRESS NOTES
POST PARTUM DAY # 2    Che Og is a 20 y.o. female  This patient was seen & examined today.     Her pregnancy was complicated by:   Patient Active Problem List   Diagnosis    Third pregnancy    High risk teen pregnancy, antepartum    Obesity affecting pregnancy    Antepartum placenta circumvallata    Tobacco use complicating pregnancy    SMA Carrier    Elevated 1hr GTT    Diet controlled gestational diabetes mellitus (GDM), antepartum    38 weeks gestation of pregnancy    RH+/ RI/GBSneg    Diabetes mellitus (HCC)     25 F Apg 8/9, Wt 7#2       Today she is doing well without any chief complaint. Her lochia is light. She denies chest pain, shortness of breath, headache, lightheadedness, blurred vision, peripheral edema, and palpitations. She is  breast feeding and she denies any breast tenderness. She is ambulating well. Her voiding pattern is normal. I reviewed signs and symptoms of post partum depression with the patient, she currently denies any of these symptoms. She is tolerating solids.     Vital Signs:  /66   Pulse 73   Temp 97.3 °F (36.3 °C) (Oral)   Resp 16   Ht 1.626 m (5' 4\")   Wt 100.7 kg (222 lb)   LMP 10/01/2024   SpO2 97%   Breastfeeding Unknown   BMI 38.11 kg/m²     Physical Exam:  General:  no apparent distress, alert, and cooperative  Neurologic:  alert, oriented, normal speech, no focal findings or movement disorder noted  Lungs:  No increased work of breathing, good air exchange  Heart:  regular rate     Abdomen: abdomen soft, non-distended, non-tender  Fundus: non-tender, normal size, firm, below umbilicus  Extremities:  no calf tenderness, non edematous    Lab:  Lab Results   Component Value Date    HGB 11.7 (L) 2025     Lab Results   Component Value Date    HCT 35.4 (L) 2025       Assessment/Plan:  Che Og is a  PPD # 2 s/p    - Doing well, VSS   - female infant in NICU   - Encourage ambulation   - Motrin tylenol    -continue pp

## 2025-07-04 NOTE — LACTATION NOTE
Pt states she pumped one time and it was too painful, so she has not pumped since. She has an evenflo pump at home. She was pumping with 21 mm flanges. Nipple measurement completed, left 16 mm, right 17 mm. Provided pt with 17 mm flange inserts for hospital pump. Reviewed with her how to check for compatibility with Evenflo pump. Reviewed recommended pump frequency and encouraged her to call out as needed.

## 2025-07-04 NOTE — FLOWSHEET NOTE
Writer sat with pt and significant other, Jose, and went over discharge paperwork and education. Pt knows that she needs to schedule a follow up appointment with her OB in 2 weeks. Pt aware of the medications that she needs to be taking and how to take them. Post birth warning signs education complete. IV removed. Enough supplies given to patient for a couple of days. Pt doesn't have any questions or concerns at this time. Pt is staying in room 749 as a courtesy stay as baby is in NICU and should be discharged tomorrow or Sunday.

## 2025-07-04 NOTE — PLAN OF CARE
Problem: Chronic Conditions and Co-morbidities  Goal: Patient's chronic conditions and co-morbidity symptoms are monitored and maintained or improved  2025 by Ashlyn Maloney RN  Outcome: Completed  2025 by Bradford Castañeda RN  Outcome: Progressing     Problem: Pain  Goal: Verbalizes/displays adequate comfort level or baseline comfort level  2025 by Ashlyn Maloney RN  Outcome: Completed  2025 by Bradford Castañeda RN  Outcome: Progressing     Problem: Safety - Adult  Goal: Free from fall injury  2025 by Ashlyn Maloney RN  Outcome: Completed  2025 by Bradford Castañeda RN  Outcome: Progressing     Problem: Postpartum  Goal: Experiences normal postpartum course  Description:  Postpartum OB-Pregnancy care plan goal which identifies if the mother is experiencing a normal postpartum course  2025 by Ashlyn Maloney RN  Outcome: Completed  2025 by Bradford Castañeda RN  Outcome: Progressing  Goal: Appropriate maternal -  bonding  Description:  Postpartum OB-Pregnancy care plan goal which identifies if the mother and  are bonding appropriately  2025 by Ashlyn Maloney RN  Outcome: Completed  2025 by Bradford Castañeda RN  Outcome: Progressing  Goal: Establishment of infant feeding pattern  Description:  Postpartum OB-Pregnancy care plan goal which identifies if the mother is establishing a feeding pattern with their   2025 by Ashlyn Maloney RN  Outcome: Completed  2025 by Bradford Castañeda RN  Outcome: Progressing  Goal: Incisions, wounds, or drain sites healing without S/S of infection  2025 by Ashlyn Maloney RN  Outcome: Completed  2025 by Bradford Castañeda RN  Outcome: Progressing     Problem: Infection - Adult  Goal: Absence of infection at discharge  2025 by Ashlyn Maloney RN  Outcome: Completed  2025 by Bradford Castañeda RN  Outcome: Progressing  Goal: Absence of infection during  hospitalization  7/4/2025 1759 by Ashlyn Maloney RN  Outcome: Completed  7/4/2025 0524 by Bradford Castañeda RN  Outcome: Progressing  Goal: Absence of fever/infection during anticipated neutropenic period  7/4/2025 1759 by Ashlyn Maloney RN  Outcome: Completed  7/4/2025 0524 by Bradford Castañeda RN  Outcome: Progressing     Problem: Discharge Planning  Goal: Discharge to home or other facility with appropriate resources  7/4/2025 1759 by Ashlyn Maloney RN  Outcome: Completed  7/4/2025 0524 by Bradford Castañeda RN  Outcome: Progressing

## 2025-07-04 NOTE — PLAN OF CARE
Problem: Chronic Conditions and Co-morbidities  Goal: Patient's chronic conditions and co-morbidity symptoms are monitored and maintained or improved  Outcome: Progressing     Problem: Pain  Goal: Verbalizes/displays adequate comfort level or baseline comfort level  Outcome: Progressing     Problem: Safety - Adult  Goal: Free from fall injury  Outcome: Progressing     Problem: Postpartum  Goal: Experiences normal postpartum course  Description:  Postpartum OB-Pregnancy care plan goal which identifies if the mother is experiencing a normal postpartum course  Outcome: Progressing  Goal: Appropriate maternal -  bonding  Description:  Postpartum OB-Pregnancy care plan goal which identifies if the mother and  are bonding appropriately  Outcome: Progressing  Goal: Establishment of infant feeding pattern  Description:  Postpartum OB-Pregnancy care plan goal which identifies if the mother is establishing a feeding pattern with their   Outcome: Progressing  Goal: Incisions, wounds, or drain sites healing without S/S of infection  Outcome: Progressing     Problem: Infection - Adult  Goal: Absence of infection at discharge  Outcome: Progressing  Goal: Absence of infection during hospitalization  Outcome: Progressing  Goal: Absence of fever/infection during anticipated neutropenic period  Outcome: Progressing     Problem: Discharge Planning  Goal: Discharge to home or other facility with appropriate resources  Outcome: Progressing

## 2025-07-16 ENCOUNTER — POSTPARTUM VISIT (OUTPATIENT)
Dept: OBGYN CLINIC | Age: 20
End: 2025-07-16

## 2025-07-16 VITALS
SYSTOLIC BLOOD PRESSURE: 120 MMHG | DIASTOLIC BLOOD PRESSURE: 76 MMHG | HEIGHT: 64 IN | BODY MASS INDEX: 35 KG/M2 | HEART RATE: 81 BPM | WEIGHT: 205 LBS

## 2025-07-16 DIAGNOSIS — F41.9 ANXIETY: ICD-10-CM

## 2025-07-16 RX ORDER — BUSPIRONE HYDROCHLORIDE 5 MG/1
5 TABLET ORAL 2 TIMES DAILY
Qty: 60 TABLET | Refills: 1 | Status: SHIPPED | OUTPATIENT
Start: 2025-07-16 | End: 2025-08-15

## 2025-07-16 RX ORDER — ESCITALOPRAM OXALATE 10 MG/1
10 TABLET ORAL DAILY
Qty: 30 TABLET | Refills: 0 | Status: SHIPPED | OUTPATIENT
Start: 2025-07-16

## 2025-07-16 ASSESSMENT — ENCOUNTER SYMPTOMS
RESPIRATORY NEGATIVE: 1
EYES NEGATIVE: 1
GASTROINTESTINAL NEGATIVE: 1
ALLERGIC/IMMUNOLOGIC NEGATIVE: 1

## 2025-07-16 NOTE — PROGRESS NOTES
HPI      Che Og is a 20 y.o. female  presents for her 2 week postpartum vaginal delivery visit.  Patient reports that she is having regular bowel movements, and is urinating without difficulty.  Patient states that her bleeding is moderate on some days when she is more active then light.  Patient is bottle feeding.  Patient denies any mastitis.  Patient denies any postpartum depression/baby blues, no suicidal, or homicidal thoughts, and has good support system, her depression screen is 12 and reports that she did have depression and anxiety before pregnancy.  She reports having some crying episodes and states she can tolerate the baby and her SO but not other people.  Patient denies any pain at this time.          OB History    Para Term  AB Living   3 1 1  2 1   SAB IAB Ectopic Molar Multiple Live Births   2    0 1      # Outcome Date GA Lbr Christoph/2nd Weight Sex Type Anes PTL Lv   3 Term 25 39w1d / 02:18 3.24 kg (7 lb 2.3 oz) F Vag-Spont EPI N CAROL   2 SAB            1 SAB              Blood pressure 120/76, pulse 81, height 1.626 m (5' 4\"), weight 93 kg (205 lb), last menstrual period 10/01/2024, not currently breastfeeding.      Patient Active Problem List    Diagnosis Date Noted     25 F Apg 8/9, Wt 7#2 2025    RH+/ RI/GBSneg 2025    Diabetes mellitus (HCC)     38 weeks gestation of pregnancy 2025    Diet controlled gestational diabetes mellitus (GDM), antepartum 2025     NSTs at 34wk per MFM      Elevated 1hr GTT 2025    Third pregnancy 2025    High risk teen pregnancy, antepartum 2025    Obesity affecting pregnancy 2025     Prepregnancy BMI 29.62      Antepartum placenta circumvallata 2025    Tobacco use complicating pregnancy 2025    SMA Carrier 2025     FOB given kit, will consider testing          Diagnosis Orders   1. Postpartum care following vaginal delivery        2. Gestational diabetes mellitus

## 2025-07-16 NOTE — PROGRESS NOTES
Postpartum Visit: Patient is here today for postpartum vaginal delivery.  I have fully reviewed the prenatal and intrapartum course.  She is 2 weeks postpartum. Patient is bottle feeding.  Her bleeding is moderate. Patient's pain is 0 out of 10 on the pain scale.   Patient is not sexually active. Current contraception method is abstinence.  Postpartum depression screening questionnaire provided to patient and is positive.

## 2025-07-30 ENCOUNTER — POSTPARTUM VISIT (OUTPATIENT)
Dept: OBGYN CLINIC | Age: 20
End: 2025-07-30
Payer: MEDICAID

## 2025-07-30 VITALS
WEIGHT: 206 LBS | BODY MASS INDEX: 35.17 KG/M2 | HEART RATE: 85 BPM | DIASTOLIC BLOOD PRESSURE: 76 MMHG | HEIGHT: 64 IN | SYSTOLIC BLOOD PRESSURE: 114 MMHG

## 2025-07-30 DIAGNOSIS — F41.9 ANXIETY: ICD-10-CM

## 2025-07-30 DIAGNOSIS — N89.8 VAGINAL DISCHARGE: ICD-10-CM

## 2025-07-30 RX ORDER — BUSPIRONE HYDROCHLORIDE 5 MG/1
5 TABLET ORAL 2 TIMES DAILY
Qty: 60 TABLET | Refills: 1 | Status: SHIPPED | OUTPATIENT
Start: 2025-07-30 | End: 2025-08-29

## 2025-07-30 RX ORDER — METRONIDAZOLE 500 MG/1
500 TABLET ORAL 2 TIMES DAILY
Qty: 14 TABLET | Refills: 0 | Status: SHIPPED | OUTPATIENT
Start: 2025-07-30 | End: 2025-08-06

## 2025-07-30 RX ORDER — ESCITALOPRAM OXALATE 10 MG/1
10 TABLET ORAL DAILY
Qty: 30 TABLET | Refills: 1 | Status: SHIPPED | OUTPATIENT
Start: 2025-07-30

## 2025-07-30 ASSESSMENT — ENCOUNTER SYMPTOMS
GASTROINTESTINAL NEGATIVE: 1
RESPIRATORY NEGATIVE: 1
EYES NEGATIVE: 1
ALLERGIC/IMMUNOLOGIC NEGATIVE: 1

## 2025-07-30 NOTE — PROGRESS NOTES
Postpartum Visit: Patient is here today for postpartum vaginal delivery.  I have fully reviewed the prenatal and intrapartum course.  She is 4 weeks postpartum. Patient is bottle feeding.  Her bleeding is light. Patient's pain is 0 out of 10 on the pain scale.   Patient is not sexually active. Current contraception method is abstinence.  Postpartum depression screening questionnaire provided to patient and is positive.

## 2025-07-30 NOTE — PROGRESS NOTES
HPI      Che Og is a 20 y.o. female  presents for her  4 week postpartum vaginal delivery visit.  Patient reports that she is having regular bowel movements, and is urinating without difficulty.  Patient states that her bleeding is scant and intermittent.  Patient is bottle feeding.  Patient denies any mastitis.  Patient reports that her postpartum depression is much improved with the medication and her depression screen today is a 9 which also has improved.  Patient denies any pain at this time.  Patient reports that she has some vaginal discharge yellow to brown and is requesting medication.         OB History    Para Term  AB Living   3 1 1  2 1   SAB IAB Ectopic Molar Multiple Live Births   2    0 1      # Outcome Date GA Lbr Christoph/2nd Weight Sex Type Anes PTL Lv   3 Term 25 39w1d / 02:18 3.24 kg (7 lb 2.3 oz) F Vag-Spont EPI N CAROL   2 SAB            1 SAB                Blood pressure 114/76, pulse 85, height 1.626 m (5' 4\"), weight 93.4 kg (206 lb), last menstrual period 10/01/2024, not currently breastfeeding.    Patient Active Problem List    Diagnosis Date Noted     25 F Apg 8/9, Wt 7#2 2025    RH+/ RI/GBSneg 2025    Diabetes mellitus (HCC)     38 weeks gestation of pregnancy 2025    Diet controlled gestational diabetes mellitus (GDM), antepartum 2025     NSTs at 34wk per MFM      Elevated 1hr GTT 2025    Third pregnancy 2025    High risk teen pregnancy, antepartum 2025    Obesity affecting pregnancy 2025     Prepregnancy BMI 29.62      Antepartum placenta circumvallata 2025    Tobacco use complicating pregnancy 2025    SMA Carrier 2025     FOB given kit, will consider testing          Diagnosis Orders   1. Postpartum care following vaginal delivery        2. Postpartum depression        3. Anxiety        4. Vaginal discharge            Vitals:    25 1018   BP: 114/76   Pulse: 85        Review of

## 2025-08-13 ENCOUNTER — POSTPARTUM VISIT (OUTPATIENT)
Dept: OBGYN CLINIC | Age: 20
End: 2025-08-13
Payer: MEDICAID

## 2025-08-13 VITALS
BODY MASS INDEX: 35.17 KG/M2 | HEART RATE: 94 BPM | SYSTOLIC BLOOD PRESSURE: 118 MMHG | WEIGHT: 206 LBS | DIASTOLIC BLOOD PRESSURE: 70 MMHG | HEIGHT: 64 IN

## 2025-08-13 DIAGNOSIS — Z30.09 BIRTH CONTROL COUNSELING: ICD-10-CM

## 2025-08-13 DIAGNOSIS — N94.6 DYSMENORRHEA: ICD-10-CM

## 2025-08-13 DIAGNOSIS — Z32.02 NEGATIVE PREGNANCY TEST: ICD-10-CM

## 2025-08-13 DIAGNOSIS — F41.9 ANXIETY: ICD-10-CM

## 2025-08-13 DIAGNOSIS — Z30.011 BCP (BIRTH CONTROL PILLS) INITIATION: ICD-10-CM

## 2025-08-13 DIAGNOSIS — Z82.49 FAMILY HISTORY OF HEART ATTACK: ICD-10-CM

## 2025-08-13 LAB
CONTROL: NORMAL
PREGNANCY TEST URINE, POC: NORMAL

## 2025-08-13 PROCEDURE — 81025 URINE PREGNANCY TEST: CPT | Performed by: CLINICAL NURSE SPECIALIST

## 2025-08-13 PROCEDURE — 99213 OFFICE O/P EST LOW 20 MIN: CPT | Performed by: CLINICAL NURSE SPECIALIST

## 2025-08-13 RX ORDER — ESCITALOPRAM OXALATE 10 MG/1
10 TABLET ORAL DAILY
Qty: 30 TABLET | Refills: 2 | Status: SHIPPED | OUTPATIENT
Start: 2025-08-13

## 2025-08-13 RX ORDER — BUSPIRONE HYDROCHLORIDE 5 MG/1
5 TABLET ORAL 2 TIMES DAILY
Qty: 60 TABLET | Refills: 2 | Status: SHIPPED | OUTPATIENT
Start: 2025-08-13 | End: 2025-09-12

## 2025-08-13 ASSESSMENT — ENCOUNTER SYMPTOMS
ALLERGIC/IMMUNOLOGIC NEGATIVE: 1
RESPIRATORY NEGATIVE: 1
GASTROINTESTINAL NEGATIVE: 1
EYES NEGATIVE: 1